# Patient Record
Sex: MALE | Race: WHITE | NOT HISPANIC OR LATINO | Employment: OTHER
[De-identification: names, ages, dates, MRNs, and addresses within clinical notes are randomized per-mention and may not be internally consistent; named-entity substitution may affect disease eponyms.]

---

## 2017-01-01 ENCOUNTER — HOSPITAL ENCOUNTER (OUTPATIENT)
Dept: LAB | Age: 82
Setting detail: SPECIMEN
Discharge: HOME | End: 2017-11-09

## 2017-01-01 ENCOUNTER — AMBULATORY - HEALTHEAST (OUTPATIENT)
Dept: CARDIOLOGY | Facility: CLINIC | Age: 82
End: 2017-01-01

## 2017-01-01 ENCOUNTER — RECORDS - HEALTHEAST (OUTPATIENT)
Dept: ADMINISTRATIVE | Facility: OTHER | Age: 82
End: 2017-01-01

## 2017-01-01 ENCOUNTER — COMMUNICATION - HEALTHEAST (OUTPATIENT)
Dept: VASCULAR SURGERY | Facility: CLINIC | Age: 82
End: 2017-01-01

## 2017-01-01 ENCOUNTER — AMBULATORY - HEALTHEAST (OUTPATIENT)
Dept: VASCULAR SURGERY | Facility: CLINIC | Age: 82
End: 2017-01-01

## 2017-01-01 ENCOUNTER — COMMUNICATION - HEALTHEAST (OUTPATIENT)
Dept: CARDIOLOGY | Facility: CLINIC | Age: 82
End: 2017-01-01

## 2017-01-01 ENCOUNTER — OFFICE VISIT - HEALTHEAST (OUTPATIENT)
Dept: VASCULAR SURGERY | Facility: CLINIC | Age: 82
End: 2017-01-01

## 2017-01-01 ENCOUNTER — OFFICE VISIT - HEALTHEAST (OUTPATIENT)
Dept: CARDIOLOGY | Facility: CLINIC | Age: 82
End: 2017-01-01

## 2017-01-01 ENCOUNTER — SURGERY - HEALTHEAST (OUTPATIENT)
Dept: CARDIOLOGY | Facility: CLINIC | Age: 82
End: 2017-01-01

## 2017-01-01 ENCOUNTER — HOSPITAL ENCOUNTER (OUTPATIENT)
Dept: CARDIOLOGY | Facility: CLINIC | Age: 82
Discharge: HOME | End: 2017-08-17
Attending: INTERNAL MEDICINE

## 2017-01-01 ENCOUNTER — HOSPITAL ENCOUNTER (OUTPATIENT)
Dept: ULTRASOUND IMAGING | Facility: CLINIC | Age: 82
Discharge: HOME | End: 2017-11-29
Attending: PHYSICAL MEDICINE & REHABILITATION

## 2017-01-01 ENCOUNTER — AMBULATORY - HEALTHEAST (OUTPATIENT)
Dept: CARE COORDINATION | Facility: CLINIC | Age: 82
End: 2017-01-01

## 2017-01-01 ENCOUNTER — COMMUNICATION - HEALTHEAST (OUTPATIENT)
Dept: SCHEDULING | Facility: CLINIC | Age: 82
End: 2017-01-01

## 2017-01-01 DIAGNOSIS — L08.9 INFECTED WOUND: ICD-10-CM

## 2017-01-01 DIAGNOSIS — I42.9 CARDIOMYOPATHY (H): ICD-10-CM

## 2017-01-01 DIAGNOSIS — Z95.810 ICD (IMPLANTABLE CARDIOVERTER-DEFIBRILLATOR) IN PLACE: ICD-10-CM

## 2017-01-01 DIAGNOSIS — Z95.810 ICD (IMPLANTABLE CARDIOVERTER-DEFIBRILLATOR), BIVENTRICULAR, IN SITU: ICD-10-CM

## 2017-01-01 DIAGNOSIS — B35.1 ONYCHOMYCOSIS WITH INGROWN TOENAIL: ICD-10-CM

## 2017-01-01 DIAGNOSIS — L97.921 LEG ULCER, LEFT, LIMITED TO BREAKDOWN OF SKIN (H): ICD-10-CM

## 2017-01-01 DIAGNOSIS — L97.521 SKIN ULCER OF LEFT FOOT, LIMITED TO BREAKDOWN OF SKIN (H): ICD-10-CM

## 2017-01-01 DIAGNOSIS — I87.2 VENOUS INSUFFICIENCY OF BOTH LOWER EXTREMITIES: ICD-10-CM

## 2017-01-01 DIAGNOSIS — I89.0 ACQUIRED LYMPHEDEMA OF LEG: ICD-10-CM

## 2017-01-01 DIAGNOSIS — I50.22 CHRONIC SYSTOLIC HEART FAILURE (H): ICD-10-CM

## 2017-01-01 DIAGNOSIS — I87.333 CHRONIC VENOUS HYPERTENSION (IDIOPATHIC) WITH ULCER AND INFLAMMATION OF BILATERAL LOWER EXTREMITY (H): ICD-10-CM

## 2017-01-01 DIAGNOSIS — I48.20 CHRONIC ATRIAL FIBRILLATION (H): ICD-10-CM

## 2017-01-01 DIAGNOSIS — M79.672 LEFT FOOT PAIN: ICD-10-CM

## 2017-01-01 DIAGNOSIS — I87.8 VENOUS STASIS: ICD-10-CM

## 2017-01-01 DIAGNOSIS — T14.8XXA INFECTED WOUND: ICD-10-CM

## 2017-01-01 DIAGNOSIS — I50.43 ACUTE ON CHRONIC COMBINED SYSTOLIC AND DIASTOLIC CONGESTIVE HEART FAILURE (H): ICD-10-CM

## 2017-01-01 DIAGNOSIS — D64.9 ANEMIA, UNSPECIFIED TYPE: ICD-10-CM

## 2017-01-01 DIAGNOSIS — L84 FOOT CALLUS: ICD-10-CM

## 2017-01-01 DIAGNOSIS — N18.30 CKD (CHRONIC KIDNEY DISEASE) STAGE 3, GFR 30-59 ML/MIN (H): ICD-10-CM

## 2017-01-01 DIAGNOSIS — I42.9 CARDIOMYOPATHY, UNSPECIFIED TYPE (H): ICD-10-CM

## 2017-01-01 DIAGNOSIS — I42.8 OTHER PRIMARY CARDIOMYOPATHIES: ICD-10-CM

## 2017-01-01 DIAGNOSIS — M79.605 LEFT LEG PAIN: ICD-10-CM

## 2017-01-01 DIAGNOSIS — I50.43 ACUTE ON CHRONIC SYSTOLIC AND DIASTOLIC HEART FAILURE, NYHA CLASS 3 (H): ICD-10-CM

## 2017-01-01 DIAGNOSIS — L90.5 SCAR CONDITION AND FIBROSIS OF SKIN: ICD-10-CM

## 2017-01-01 DIAGNOSIS — L97.201: ICD-10-CM

## 2017-01-01 DIAGNOSIS — L03.116 LEFT LEG CELLULITIS: ICD-10-CM

## 2017-01-01 DIAGNOSIS — Z45.02 ICD (IMPLANTABLE CARDIOVERTER-DEFIBRILLATOR) BATTERY DEPLETION: ICD-10-CM

## 2017-01-01 DIAGNOSIS — I73.9 CLAUDICATION (H): ICD-10-CM

## 2017-01-01 DIAGNOSIS — L60.0 ONYCHOMYCOSIS WITH INGROWN TOENAIL: ICD-10-CM

## 2017-01-01 LAB
AORTIC ROOT: 2.8 CM
BSA FOR ECHO PROCEDURE: 1.99 M2
CV BLOOD PRESSURE: NORMAL MMHG
CV ECHO HEIGHT: 68 IN
CV ECHO WEIGHT: 183 LBS
DOP CALC LVOT AREA: 3.46 CM2
DOP CALC LVOT DIAMETER: 2.1 CM
DOP CALC LVOT PEAK VEL: 71.4 CM/S
DOP CALC LVOT STROKE VOLUME: 56.1 CM3
DOP CALCLVOT PEAK VEL VTI: 16.2 CM
EJECTION FRACTION: 46 % (ref 55–75)
FRACTIONAL SHORTENING: 23.9 % (ref 28–44)
HCC DEVICE COMMENTS: NORMAL
INTERVENTRICULAR SEPTUM IN END DIASTOLE: 1.3 CM (ref 0.6–1)
IVS/PW RATIO: 1.2
LA AREA 1: 25.4 CM2
LA AREA 2: 19.7 CM2
LEFT ATRIUM LENGTH: 5.87 CM
LEFT ATRIUM SIZE: 4.5 CM
LEFT ATRIUM TO AORTIC ROOT RATIO: 1.61 NO UNITS
LEFT ATRIUM VOLUME INDEX: 36.4 ML/M2
LEFT ATRIUM VOLUME: 72.5 CM3
LEFT VENTRICLE CARDIAC INDEX: 1.7 L/MIN/M2
LEFT VENTRICLE CARDIAC OUTPUT: 3.4 L/MIN
LEFT VENTRICLE DIASTOLIC VOLUME INDEX: 93 CM3/M2 (ref 34–74)
LEFT VENTRICLE DIASTOLIC VOLUME: 185 CM3 (ref 62–150)
LEFT VENTRICLE HEART RATE: 61 BPM
LEFT VENTRICLE MASS INDEX: 103 G/M2
LEFT VENTRICLE SYSTOLIC VOLUME INDEX: 50.3 CM3/M2 (ref 11–31)
LEFT VENTRICLE SYSTOLIC VOLUME: 100 CM3 (ref 21–61)
LEFT VENTRICULAR INTERNAL DIMENSION IN DIASTOLE: 4.6 CM (ref 4.2–5.8)
LEFT VENTRICULAR INTERNAL DIMENSION IN SYSTOLE: 3.5 CM (ref 2.5–4)
LEFT VENTRICULAR MASS: 205 G
LEFT VENTRICULAR OUTFLOW TRACT MEAN GRADIENT: 1 MMHG
LEFT VENTRICULAR OUTFLOW TRACT MEAN VELOCITY: 48.5 CM/S
LEFT VENTRICULAR OUTFLOW TRACT PEAK GRADIENT: 2 MMHG
LEFT VENTRICULAR POSTERIOR WALL IN END DIASTOLE: 1.1 CM (ref 0.6–1)
LV STROKE VOLUME INDEX: 28.2 ML/M2
MITRAL REGURGITANT VELOCITY TIME INTEGRAL: 154 CM
MITRAL VALVE E/A RATIO: 2.2
MR FLOW: 17 CM3
MR MEAN GRADIENT: 57 MMHG
MR MEAN VELOCITY: 360 CM/S
MR PEAK GRADIENT: 88.4 MMHG
MR PISA EROA: 0.1 CM2
MR PISA RADIUS: 0.5 CM
MR PISA VN NYQUIST: 32.3 CM/S
MV AVERAGE E/E' RATIO: 13.5 CM/S
MV DECELERATION TIME: 169 MS
MV E'TISSUE VEL-LAT: 11.2 CM/S
MV E'TISSUE VEL-MED: 8.38 CM/S
MV LATERAL E/E' RATIO: 11.8
MV MEDIAL E/E' RATIO: 15.8
MV PEAK A VELOCITY: 60.7 CM/S
MV PEAK E VELOCITY: 132 CM/S
MV REGURGITANT VOLUME: 16.6 CC
NUC REST DIASTOLIC VOLUME INDEX: 2928 LBS
NUC REST SYSTOLIC VOLUME INDEX: 68 IN
PISA MR PEAK VEL: 470 CM/S
RIGHT VENTRICULAR INTERNAL DIMENSION IN DYSTOLE: 2.9 CM
TRICUSPID REGURGITATION PEAK PRESSURE GRADIENT: 43 MMHG
TRICUSPID VALVE ANULAR PLANE SYSTOLIC EXCURSION: 1.5 CM
TRICUSPID VALVE PEAK REGURGITANT VELOCITY: 328 CM/S

## 2017-01-01 ASSESSMENT — MIFFLIN-ST. JEOR
SCORE: 1394.22
SCORE: 1439.58
SCORE: 1414.64
SCORE: 1451.38
SCORE: 1487.21
SCORE: 1518.97
SCORE: 1439.58
SCORE: 1448.21
SCORE: 1491.75

## 2017-01-04 ENCOUNTER — AMBULATORY - HEALTHEAST (OUTPATIENT)
Dept: CARDIOLOGY | Facility: CLINIC | Age: 82
End: 2017-01-04

## 2017-01-04 DIAGNOSIS — Z95.810 ICD (IMPLANTABLE CARDIOVERTER-DEFIBRILLATOR) IN PLACE: ICD-10-CM

## 2017-01-08 ENCOUNTER — COMMUNICATION - HEALTHEAST (OUTPATIENT)
Dept: VASCULAR SURGERY | Facility: CLINIC | Age: 82
End: 2017-01-08

## 2017-01-08 DIAGNOSIS — L97.201: ICD-10-CM

## 2017-01-08 DIAGNOSIS — B35.1 ONYCHOMYCOSIS WITH INGROWN TOENAIL: ICD-10-CM

## 2017-01-08 DIAGNOSIS — I87.8 VENOUS STASIS: ICD-10-CM

## 2017-01-08 DIAGNOSIS — L90.5 SCAR CONDITION AND FIBROSIS OF SKIN: ICD-10-CM

## 2017-01-08 DIAGNOSIS — I89.0 ACQUIRED LYMPHEDEMA OF LEG: ICD-10-CM

## 2017-01-08 DIAGNOSIS — L84 FOOT CALLUS: ICD-10-CM

## 2017-01-08 DIAGNOSIS — L60.0 ONYCHOMYCOSIS WITH INGROWN TOENAIL: ICD-10-CM

## 2021-05-28 ENCOUNTER — RECORDS - HEALTHEAST (OUTPATIENT)
Dept: ADMINISTRATIVE | Facility: CLINIC | Age: 86
End: 2021-05-28

## 2021-05-29 ENCOUNTER — RECORDS - HEALTHEAST (OUTPATIENT)
Dept: ADMINISTRATIVE | Facility: CLINIC | Age: 86
End: 2021-05-29

## 2021-05-30 ENCOUNTER — RECORDS - HEALTHEAST (OUTPATIENT)
Dept: ADMINISTRATIVE | Facility: CLINIC | Age: 86
End: 2021-05-30

## 2021-05-31 VITALS — BODY MASS INDEX: 27.74 KG/M2 | WEIGHT: 183 LBS | HEIGHT: 68 IN

## 2021-05-31 VITALS — BODY MASS INDEX: 26.87 KG/M2 | HEIGHT: 69 IN | WEIGHT: 181.4 LBS

## 2021-05-31 VITALS — HEIGHT: 68 IN | WEIGHT: 173 LBS | BODY MASS INDEX: 26.22 KG/M2

## 2021-05-31 VITALS — HEIGHT: 69 IN | WEIGHT: 190 LBS | BODY MASS INDEX: 28.14 KG/M2

## 2021-05-31 VITALS — HEIGHT: 69 IN | WEIGHT: 182.1 LBS | BODY MASS INDEX: 26.97 KG/M2

## 2021-05-31 VITALS — BODY MASS INDEX: 25.77 KG/M2 | HEIGHT: 69 IN | WEIGHT: 174 LBS

## 2021-05-31 VITALS — WEIGHT: 191 LBS | HEIGHT: 69 IN | BODY MASS INDEX: 28.29 KG/M2

## 2021-05-31 VITALS — BODY MASS INDEX: 27.74 KG/M2 | HEIGHT: 68 IN | WEIGHT: 183 LBS

## 2021-05-31 VITALS — BODY MASS INDEX: 29.18 KG/M2 | HEIGHT: 69 IN | WEIGHT: 197 LBS

## 2021-06-02 ENCOUNTER — RECORDS - HEALTHEAST (OUTPATIENT)
Dept: ADMINISTRATIVE | Facility: CLINIC | Age: 86
End: 2021-06-02

## 2021-06-09 ENCOUNTER — RECORDS - HEALTHEAST (OUTPATIENT)
Dept: ADMINISTRATIVE | Facility: CLINIC | Age: 86
End: 2021-06-09

## 2021-06-10 NOTE — PROGRESS NOTES
[facility] Heart Care Note    Assessment:  1.  Chronic atrial fibrillation with pacemaker dependence - poor escape rhythm. High %   2. Medtronic  ICD  Melvin lead; 7-        generator replacement 05/20/2012. LV lead placed; but cannot pace the left ventricle without  PNS. -LV lead turned  off  4. Advanced left ventricular dysfunction with ejection fraction 25%.        Echocardiogram 6- with EF=46%       Echocardiogram April 26, 2016 showed ejection fraction equals 45%  5. History of out-of-hospital ventricular fibrillation        rescued from ICD on 07/16/2014.   6. Chronic anticoagulation with warfarin.   7. Gout.  CRF; Cr=2.49 10-      Plan:  Pacemaker defibrillator evaluation today is good  Battery  Voltage of the ICD  is low and I anticipate that we will need to change out the generator within the next 3-6 months   I anticipate that this will be a generator replacement and no new leads would be implanted.  If so, this could be done as an outpatient  I do not anticipate any new leads even though there is concern about his RV lead and the LV lead is not working  Otherwise device evaluation was excellent  You do have an LV lead but this has been turned off because of phrenic nerve stimulation issues  Medtronic suspect   lead; 6949; [place] with nominal function  Continue to have device checked every month  Same medications    Subjective:    I had the opportunity to see.Theodore Gomes , who is a 89 y.o. male with a known history of advanced left ventricular dysfunction and pacemaker dependence  Chato is quite satisfied with his exercise capacity.  Although he slowed down, he still can do most of his activities without limitations.  He does remain fairly active  Is not been having angina  He denies excessive breathlessness orthopnea PND or pedal edema  He said no awareness of arrhythmias, no syncope  He was seen in the emergency room February 22, 2017 with GI distress nausea and vomiting.   Fortunately these symptoms resolved within 24 hours.  At that time his creatinine equals 2.27, hemoglobin equals 10.9      Problem List:  Patient Active Problem List   Diagnosis     Essential hypertension     Cardiomyopathy     Permanent Atrial Fibrillation     Hyperlipidemia     Mitral Regurgitation, moderate     CKD (chronic kidney disease) stage 3, GFR 30-59 ml/min     Venous stasis     Acquired lymphedema of leg     Callus     Chronic systolic heart failure     Anemia     Medical History:  Past Medical History:   Diagnosis Date     Anemia      Atrial fibrillation      BPH (benign prostatic hyperplasia)      Cardiac defibrillator in place      Chronic systolic heart failure      Clostridium difficile colitis 6/29/2014     CVA, old, homonymous hemianopsia      Ejection fraction < 50% at 25%     Esophagitis      Hyperlipidemia      Hypertension      Peripheral edema     chronic with venous insufficiency     Stroke Syndrome      Surgical History:  Past Surgical History:   Procedure Laterality Date     VT INSJ/RPLCMT PERM DFB W/TRNSVNS LDS 1/DUAL CHMBR      Description: Cardioverter-defibrillator Pulse Generator Insertion With LV Leads;  Recorded: 06/15/2012;  Comments: 5/22/12 upgraded from ICD to BiVICD     Social History:  Social History     Social History     Marital status:      Spouse name: N/A     Number of children: N/A     Years of education: N/A     Occupational History     Not on file.     Social History Main Topics     Smoking status: Former Smoker     Quit date: 6/27/1958     Smokeless tobacco: Never Used     Alcohol use No     Drug use: No     Sexual activity: Yes     Partners: Female     Other Topics Concern     Not on file     Social History Narrative       Review of Systems:      General: WNL  Eyes: WNL  Ears/Nose/Throat: WNL  Lungs: WNL  Heart: WNL  Stomach: WNL  Bladder: WNL  Muscle/Joints: WNL  Skin: WNL  Nervous System: WNL  Mental Health: WNL     Blood: WNL        Family History:  Family  History   Problem Relation Age of Onset     Early death Sister          Allergies:  Allergies   Allergen Reactions     Hydromorphone Anaphylaxis     Clindamycin Diarrhea     Cephalexin Rash       Medications:  Current Outpatient Prescriptions   Medication Sig Dispense Refill     acetaminophen (TYLENOL) 500 MG tablet Take 1,000 mg by mouth every 6 (six) hours as needed for pain.       allopurinol (ZYLOPRIM) 100 MG tablet Take 100 mg by mouth 2 (two) times a day.        bumetanide (BUMEX) 2 MG tablet Take 2 mg by mouth 2 (two) times a day.   0     carvedilol (COREG) 25 MG tablet Take 25 mg by mouth 2 (two) times a day with meals.       diclofenac sodium (VOLTAREN) 1 % Gel Apply topically 4 (four) times a day.       fluocinonide (LIDEX) 0.05 % cream APPLY TO ITCHING RED AREAS OF SKIN TWICE DAILY AS NEEDED. DO NOT APPLY TO OPENED AREAS OF SKIN 30 g 2     fluticasone (FLONASE) 50 mcg/actuation nasal spray 1 spray into each nostril daily.       L ACIDOPHIL/B LACTIS/B LONGUM (FLORAJEN3 ORAL) Take 1 tablet by mouth daily.       lisinopril (PRINIVIL,ZESTRIL) 10 MG tablet Take 5 mg by mouth daily.        lisinopril (PRINIVIL,ZESTRIL) 5 MG tablet Take 5 mg by mouth daily.       miconazole (REMEDY ANTIFUNGAL) 2 % cream Apply topically 2 (two) times a day.       omeprazole (PRILOSEC) 20 MG capsule Take 20 mg by mouth daily.       piroxicam (FELDENE) 10 MG capsule Take 10 mg by mouth daily.       potassium chloride SA (K-DUR,KLOR-CON) 20 MEQ tablet Take 20 mEq by mouth 3 (three) times a day.       simvastatin (ZOCOR) 20 MG tablet Take 20 mg by mouth bedtime.       warfarin (COUMADIN) 2.5 MG tablet Take 2.5 mg by mouth daily. Adjust dose based on INR results as directed.       Current Facility-Administered Medications   Medication Dose Route Frequency Provider Last Rate Last Dose     lidocaine 2 % jelly (XYLOCAINE)   Topical PRN Ayesha Washington NP             Surgical site  ICD is well-healed left subclavicular site there is no  "signs of infection    Device interrogation    Intrinsic rhythm is atrial fibrillation with escape rhythm in the 30s   97% ventricular pacing   YENY by voltage but device is not cleared YENY status it is close to that point  Lead function is satisfactory there are no short intervals  Patient was shown the alert tones and could hear them-these will go off if YENY status is reached    Objective:   Vital signs:  /60 (Patient Site: Left Arm, Patient Position: Sitting, Cuff Size: Adult Large)  Pulse 68  Resp 18  Ht 5' 8\" (1.727 m)  Wt 173 lb (78.5 kg)  BMI 26.3 kg/m2      Physical Exam:    GENERAL APPEARANCE: Alert, cooperative and in no acute distress.  HEENT: No scleral icterus. No Xanthelasma. Oral mucous membranes pink and moist.  NECK: JVP cm. No Hepatojugular reflux. Thyroid not  Palpable  CHEST: clear to auscultation and percussion  CARDIOVASCULAR: S1, S2 without murmur    Brachial, radial  pulses are intact and symmetric.   No carotid bruits noted.  ABDOMEN: Non tender. BS+. No bruits.  EXTREMITIES: No cyanosis, clubbing or edema.    Lab Results:  LIPIDS:  No results found for: CHOL  No results found for: HDL  No results found for: LDLCALC  No results found for: TRIG  No components found for: CHOLHDL    BMP:  Lab Results   Component Value Date    CREATININE 2.27 (H) 02/22/2017    BUN 83 (H) 02/22/2017     02/22/2017    K 4.9 02/22/2017     02/22/2017    CO2 24 02/22/2017         This note has been dictated using voice recognition software. Any grammatical or context distortions are unintentional and inherent to the software.  Young Yung MD  [facility]  [phone]            "

## 2021-06-10 NOTE — PROGRESS NOTES
In clinic device check with Dr. Yung.  Please see link for full device report.  Patient was informed of results and next follow up during today's visit.

## 2021-06-11 NOTE — PROGRESS NOTES
8/13/1925  [phone] (home)  There is no such number on file (mobile).    +++Important patient information for CSC/Cath Lab staff : None+++    [facility] EP Cath Lab Procedure Order     Device Implant/Revision:  Procedure: Generator Change Out  Device Type: BIV ICD  Device Company/Device Rep Needed for Procedure: Medtronic    Diagnosis:  Cardiomyopathy  Anticipated Case Duration:  2  Scheduling Needs/Timeframe:  Next Available  Anesthesia:  None  Research Protocol:  No    [facility] EP Cath Lab Prep   Ordering Provider: Dr Yung  Ordering Date: 6/13/2017  Orders Status: Intial order placed and Order set placed  EP NC Contact: Sherron Egan RN    H&P:  Compled by Dr Rivas on 5/25/17  PCP: Samantha Ledezma MD, [phone]    Pre-op Labs: Ordered AM of procedure    Medical Records Pertinent for Procedure:  Echo 4/26/16 EPIC, EKG 2/22/17 EPIC and Device Implant Record 5/22/12    Patient Education:    Teach with Patient: Completed via phone prior to procedure    Risks Reviewed:        Internal Cardiac Defibrillator     <1% for each of the following: infection, bleeding, hematoma,   pneumothorax, subclavian vein thrombosis, cardiac perforation, cardiac tamponade, arrhythmias, pectoral or diaphragmatic stimulation, air embolus, pocket erosion.    <4 % lead dislodgment; <1% lead fracture or generator malfunction.    <0.5% CVA or MI.     <0.1% death.    If external defibrillation is needed, 25% risk for superficial burn.    <5% risk of ICD system revision.    >95% VT/VF success implant rate.    Risks associated with general anesthesia will be addressed by the Anesthesiology Department.    For patients on anticoagulation, the risk of bleeding, hematoma, tamponade, and stroke with partial withdrawal are increased.  Patient education also completed on MARIE, spurious shocks, driving limitation, and psychological and social aspects of having an ICD.    Biventricular Implants  In addition to standard risks for ICD or pacemaker implant,  there is:    90% success of LV lead placement.    10%  dislodgment (LV lead)    <3% risk venous rupture, cardiac tamponade  Patient counseled re: options if LV lead placement is not feasible transvenously.    Consent: Will be obtained in [facility] day of procedure    Pre-Procedure Instructions that were Reviewed with Patient:  NPO after midnight, Notified patient of time and date of procedure by CV , Transportation arrangements needed s/p procedure, Post-procedure follow up process, Sedation plan/orders and Pre-procedure letter was sent to pt by CV     Pre-Procedure Medication Instructions:  Instructions given to pt regarding anticoagulants: Coumadin- to hold 2days prior to procedure  Instructions given to pt regarding antiarrhythmic medication: N/A; N/A  Instructions for medication, other than anticoagulants/antiarrhythmics listed above, given to pt: to take all morning medications with small sips of water, with the exception of OTC supplements and MVI      Allergies   Allergen Reactions     Hydromorphone Anaphylaxis     Clindamycin Diarrhea     Cephalexin Rash       Current Outpatient Prescriptions:      acetaminophen (TYLENOL) 500 MG tablet, Take 1,000 mg by mouth every 6 (six) hours as needed for pain., Disp: , Rfl:      allopurinol (ZYLOPRIM) 100 MG tablet, Take 100 mg by mouth 2 (two) times a day. , Disp: , Rfl:      bumetanide (BUMEX) 2 MG tablet, Take 2 mg by mouth 2 (two) times a day. , Disp: , Rfl: 0     carvedilol (COREG) 25 MG tablet, Take 25 mg by mouth 2 (two) times a day with meals., Disp: , Rfl:      diclofenac sodium (VOLTAREN) 1 % Gel, Apply topically 4 (four) times a day., Disp: , Rfl:      fluocinonide (LIDEX) 0.05 % cream, APPLY TO ITCHING RED AREAS OF SKIN TWICE DAILY AS NEEDED. DO NOT APPLY TO OPENED AREAS OF SKIN, Disp: 30 g, Rfl: 2     fluticasone (FLONASE) 50 mcg/actuation nasal spray, 1 spray into each nostril daily., Disp: , Rfl:      L ACIDOPHIL/B LACTIS/B LONGUM  (FLORAJEN3 ORAL), Take 1 tablet by mouth daily., Disp: , Rfl:      lisinopril (PRINIVIL,ZESTRIL) 10 MG tablet, Take 5 mg by mouth daily. , Disp: , Rfl:      lisinopril (PRINIVIL,ZESTRIL) 5 MG tablet, Take 5 mg by mouth daily., Disp: , Rfl:      miconazole (REMEDY ANTIFUNGAL) 2 % cream, Apply topically 2 (two) times a day., Disp: , Rfl:      omeprazole (PRILOSEC) 20 MG capsule, Take 20 mg by mouth daily., Disp: , Rfl:      piroxicam (FELDENE) 10 MG capsule, Take 10 mg by mouth daily., Disp: , Rfl:      potassium chloride SA (K-DUR,KLOR-CON) 20 MEQ tablet, Take 20 mEq by mouth 3 (three) times a day., Disp: , Rfl:      simvastatin (ZOCOR) 20 MG tablet, Take 20 mg by mouth bedtime., Disp: , Rfl:      warfarin (COUMADIN) 2.5 MG tablet, Take 2.5 mg by mouth daily. Adjust dose based on INR results as directed., Disp: , Rfl:     Current Facility-Administered Medications:      lidocaine 2 % jelly (XYLOCAINE), , Topical, PRN, Ayesha Washington NP

## 2021-06-11 NOTE — PROGRESS NOTES
EP MD/EP NC YENY Review  Dr Yung reviewed YENY checklist  Reviewed by Sherron Egan    Pt aware of above orders and Order for procedure placed in EPIC and  aware  Sherron

## 2021-06-11 NOTE — PROGRESS NOTES
"Device Data  ICD and Biventricular     :  Medtronic  Model:  [id number] Protecta CRT-D  Serial Number:  [id number]     Implant Date: 5/22/2012  YENY Date:  6/9/2017  Device Diagnosis:  Ventricular Fibrillation; secondary prevention     Device Alert(s):  No     Lead Data  (Print Device History and attach to this document. Enter each lead  and model number.)  Last Interrogation Date: 5/25/2017       Atrium: Medtronic 5076 CapSureFix Novus                        Lead Imp:  342Ohms, Pacing Threshold:  N/A and Sensing Threshold:  0.125 mV       Right Ventricle: Medtronic 6949 Sprint [place]                        Lead Imp:  361Ohms, Pacing Threshold:  1.5V @ 0.4 ms and Sensing Threshold:  5.75 mV                        Shock Imp: 43/58 Ohms       Left Ventricle: Medtronic 4296 Attain Ability Plus                        Lead Imp:  703Ohms, Pacing Threshold:  N/A and Sensing Threshold:  N/A-- LV lead programmed off in 2013 due to Phrenic Nerve Stimulation     Old Leads Present/Abandoned: No     Lead Alert(s):  Yes  Description:  RV lead is a Medtronic 6949 lead, possible lead fractures; per Paceart this lead was \"repaired\" on 5/22/2012. No short v-v interval counts. Lead measurements stable.   CRT-D at [facility]  LV lead has been turned off because of phrenic nerve stimulation so he essentially has a dual-chamber device  Is pacemaker dependent  Plan  Hold warfarin for 2 days prior to procedure  ICD generator replacement with Medtronic  Do not anticipate any new leads  No DFT  "

## 2021-06-11 NOTE — PROGRESS NOTES
"Heart Care Device Change-Out Checklist (YENY Checklist)    Device Data  ICD and Biventricular    :  Medtronic  Model:  [id number] Protecta CRT-D  Serial Number:  [id number]    Implant Date: 5/22/2012  YENY Date:  6/9/2017  Device Diagnosis:  Ventricular Fibrillation; secondary prevention    Device Alert(s):  No    Lead Data  (Print Device History and attach to this document. Enter each lead  and model number.)  Last Interrogation Date: 5/25/2017      Atrium: Medtronic 5076 CapSureFix Novus   Lead Imp:  342Ohms, Pacing Threshold:  N/A and Sensing Threshold:  0.125 mV      Right Ventricle: Medtronic 6949 Sprint [place]   Lead Imp:  361Ohms, Pacing Threshold:  1.5V @ 0.4 ms and Sensing Threshold:  5.75 mV   Shock Imp: 43/58 Ohms      Left Ventricle: Medtronic 4296 Attain Ability Plus   Lead Imp:  703Ohms, Pacing Threshold:  N/A and Sensing Threshold:  N/A-- LV lead programmed off in 2013 due to Phrenic Nerve Stimulation    Old Leads Present/Abandoned: No    Lead Alert(s):  Yes  Description:  RV lead is a Medtronic 6949 lead, possible lead fractures; per Paceart this lead was \"repaired\" on 5/22/2012. No short v-v interval counts. Lead measurements stable.    Diagnostic Information  Intrinsic Rhythm:  Atrial Fibrillation with slow ventricular response, rates in the 30s.     Atrial Fibrillation:  Chronic Atrial-Fib  Takes Anticoagulant? Yes  Description:  Warfarin    Pacing Percentages  Atrial Pacing N/A% and Ventricle Pacing 97%  Pacemaker Dependent? No- History of Dependency listed in Paceart    History of VT/VF therapy    ATP: No  Appropriate Shocks:  Yes, 30J shock for VF in July 2014    Ejection Fraction  Last EF Date:  4/26/16    By Echocardiogram  Last EF Measurement:  45%    Heart Failure Diagnostics:  N/A    Special Instructions/Timeframe for change-out:  Per Carelink notes: Device has <3 months to EOS, needs change out soon.    Routed to EP:  Yes: Dr. Young Yung      Device RN: Edie CLIFTON" NALINI Goetz

## 2021-06-12 NOTE — PROGRESS NOTES
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  He will have pulse generator replaced later this month.  Dr. Yung is not planning to replace LV lead which is deactivated at present time.  Continue current dose of Bumex.  Continue local therapy through lymphedema clinic.  Routine follow-up in 6 months    Subjective:   This is 89 y.o. male who comes in today for routine follow-up visit.  He has chronic lower extremity edema.  He sees Dr. Delcid in vascular clinic.  He denies PND and orthopnea.  He has not had chest pains.    Review of Systems:   General: WNL  Eyes: WNL  Ears/Nose/Throat: WNL  Lungs: WNL  Heart: WNL  Stomach: WNL  Bladder: WNL  Muscle/Joints: WNL  Skin: WNL  Nervous System: WNL  Mental Health: WNL     Blood: WNL    Objective:   /60 (Patient Site: Left Arm, Patient Position: Sitting, Cuff Size: Adult Large)  Pulse 76  Resp 18  Ht 5' 8\" (1.727 m)  Wt 183 lb (83 kg)  BMI 27.83 kg/m2  Physical Exam:  GENERAL: no distress  NECK: No JVD  LUNGS: Clear to auscultation.  CARDIAC: irregular rhythm, S1 & S2 normal.  No heaves, thrills, gallops or murmurs.  ABDOMEN: flat, negative hepatosplenomegaly, soft and non-tender.  EXTREMITIES: No evidence of cyanosis, clubbing 3+.    Current Outpatient Prescriptions   Medication Sig Dispense Refill     acetaminophen (TYLENOL) 500 MG tablet Take 1,000 mg by mouth every 6 (six) hours as needed for pain.       allopurinol " (ZYLOPRIM) 100 MG tablet Take 100 mg by mouth 2 (two) times a day.        bumetanide (BUMEX) 2 MG tablet Take 2 mg by mouth 2 (two) times a day.   0     carvedilol (COREG) 25 MG tablet Take 25 mg by mouth 2 (two) times a day with meals.       diclofenac sodium (VOLTAREN) 1 % Gel Apply topically 4 (four) times a day.       fluocinonide (LIDEX) 0.05 % cream APPLY TO ITCHING RED AREAS OF SKIN TWICE DAILY AS NEEDED. DO NOT APPLY TO OPENED AREAS OF SKIN 30 g 2     fluticasone (FLONASE) 50 mcg/actuation nasal spray 1 spray into each nostril daily.       L ACIDOPHIL/B LACTIS/B LONGUM (FLORAJEN3 ORAL) Take 1 tablet by mouth daily.       lisinopril (PRINIVIL,ZESTRIL) 5 MG tablet Take 5 mg by mouth daily.       miconazole (REMEDY ANTIFUNGAL) 2 % cream Apply topically 2 (two) times a day.       omeprazole (PRILOSEC) 20 MG capsule Take 20 mg by mouth daily.       piroxicam (FELDENE) 10 MG capsule Take 10 mg by mouth daily.       potassium chloride SA (K-DUR,KLOR-CON) 20 MEQ tablet Take 20 mEq by mouth 3 (three) times a day.       simvastatin (ZOCOR) 20 MG tablet Take 20 mg by mouth bedtime.       warfarin (COUMADIN) 2.5 MG tablet Take 2.5 mg by mouth daily. Adjust dose based on INR results as directed.       lisinopril (PRINIVIL,ZESTRIL) 10 MG tablet Take 5 mg by mouth daily.        Current Facility-Administered Medications   Medication Dose Route Frequency Provider Last Rate Last Dose     lidocaine 2 % jelly (XYLOCAINE)   Topical PRN Ayesha Washington NP           Cardiographics:    Echocardiogram: April 2016 Global hypokinesis of the left ventricle with mild impairment of systolic  function.  Left ventricular ejection fraction is calculated to be 45%.  Pacer wire visualized in right ventricle.  TAPSE is abnormal, which is suggestive of decreased RV systolic function.  Moderately enlarged left atrium.  Mild aortic regurgitation.  Mild to moderate mitral regurgitation is present.  This study was compared with a previously done  echocardiogram 6/24/15. The  results are similar.      Stress Test: 2011 negative perfusion scan      Coronary angio: 2006 no significant coronary artery disease     Lab Results:       No results found for: CHOL  No results found for: HDL  No results found for: LDLCALC  No results found for: TRIG  No components found for: CHOLHDL  BNP   Date Value Ref Range Status   10/04/2016 312 (H) 0 - 93 pg/mL Final       Onur (Quang)  MD Francie

## 2021-06-12 NOTE — PROGRESS NOTES
"DEVICE CLINIC RN POST-OP NOTE    Reason for visit: Post-operative wound and device check; s/p CRT-D generator change-out.     Device: Medtronic [id number] Viva XT CRT-D. Pre-existing leads -  RA Lead: Medtronic 5076 CapSureFix Novus and RV Lead: Medtronic 6949 [place], DOI 07/31/2006. LV Lead: Medtronic 4296 Attain Ability Plus, DOI 05/22/2012.  Procedure date: 08/22/2017  Implant Diagnosis: Cardiomyopathy, ICD Battery Depletion  Implanting Physician: Dr. Young Yung      Assessment  Subjective: \"I've had no problems with this.\"  Vitals:   Vitals:    08/29/17 1252   BP: 90/40   Pulse: 61   Resp: 16   Temp: 97.6  F (36.4  C)     Heart Sounds: normal  Lung Sounds: clear to auscultation bilaterally  Incision/device pocket: Clean, dry and intact with resolving ecchymosis and edema.  There are no signs of infection present.  The surgical staple were removed at today's visit and the area cleansed of residual adhesive from the bandage.  Other: Instructed patient that he can apply an ice pack to the operative site should he experience discomfort.      Device Findings  Interrogation of device reveals normal sensing and capture thresholds  See the Paceart Report for a full summary of the device information.    Other: Intrinsic rhythm at today's visit is atrial fibrillation with a ventricular response in the 30s-50 bpm range.      Patient Education  hTeodore Gomes was accompanied today by his wife, Benita.     [facility]'s Partnership Agreement for Device Patients and Post-operative Checklist were presented and reviewed with  and Mrs. Gomes at today's appointment.    Signs and symptoms of infection, poor wound healing, and normal device function were reviewed. Range of motion and weight restrictions are waived as Mr. Gomes's leads are not newly implanted. He was issued a [employer] remote monitor and instructed on its set-up and use for remote monitoring by the Medtronic representative prior to " hospital discharge. These instructions were reviewed with  and Mrs. Gomes at today s visit.       Plan  - Three month remote device check 11/28/2017    Radha Velasquez, RN, MA  Device Nurse  Former[facility]

## 2021-06-12 NOTE — PROGRESS NOTES
Date of Service: 07/20/17    Date last seen:  09/15/2016    PCP: Samantha Ledezma MD    IMPRESSION:   1. Lower extremity swelling due to venous stasis and secondary lymphedema with       dependent edema under excellent control.   2. Left leg venous ulcerations, ?wound infection-new  3. Stasis dermatitis  4. Complicated by CHF  5. On chronic anticoagulation     PLAN:   1. After permission was obtained 2% Lidocaine HCL jelly was applied, under clean conditions, the left, calf and venous stasis/insufficiency with venous hypertension ulceration(s) were debrided using currette.  Devitalized and non viable tissue, along with any fibrin and slough, was removed to improve granulation tissue formation, stimulate wound healing, decrease overall bacteria load, disrupt biofilm formation and decrease edge senescence.  Total excisional debridement was 4 sq cm from the epidermis/dermis area.   Ulcers were  improved afterwards and .  Measures were as noted on the flow sheet .  2.  Because of underlying concern of cellulitis and wound infection, Braxton swab culture technique was used to send off anaerobic and aerobic Gram stain and culture from the left calf ulcer.  3. Continue velcro compression.    4. Left leg wound care:  Alginate with silver then abd then 2 layer light.   5. Follow up weekly with nursing for dressing changes.  To see me or CNP in 3 weeks.  I will see three weeks later.     ---------------------------------------------------------------------------------------------------------------------     Chief Complaint: Bilateral leg swelling and increased pain left second toe    History of Present Illness:   Theodore Gomes returns to the [facility] for follow up of bilateral leg swelling due to venous stasis and hypertension and secondary lymphedema.  He was doing very well and using his compression regularly.  Three weeks he started develop some flaking of the skin and they started to wash the skin  and using Remedy cream.  He then in the last week developed weeping and ulcers in the front of the left leg.  [facility]   The patient's previous treatment has included MLD, education, compression bandaging, lymphatic exercise, range of motion work, exercise and elevation when able.  There has been no new numbness, tingling, weakness, masses, rashes, shortness of breath or chest pain. He has redeveloped callus in the left second toe. There has been no new fevers or pain. There are no new or changing skin lesions.       Past Medical History:   Diagnosis Date     Anemia      Atrial fibrillation      BPH (benign prostatic hyperplasia)      Cardiac defibrillator in place      Chronic systolic heart failure      Clostridium difficile colitis 6/29/2014     CVA, old, homonymous hemianopsia      Ejection fraction < 50% at 25%     Esophagitis      Hyperlipidemia      Hypertension      Peripheral edema     chronic with venous insufficiency     Stroke Syndrome        Past Surgical History:   Procedure Laterality Date     AL INSJ/RPLCMT PERM DFB W/TRNSVNS LDS 1/DUAL CHMBR      Description: Cardioverter-defibrillator Pulse Generator Insertion With LV Leads;  Recorded: 06/15/2012;  Comments: 5/22/12 upgraded from ICD to BiVICD       Current Outpatient Prescriptions   Medication Sig Dispense Refill     acetaminophen (TYLENOL) 500 MG tablet Take 1,000 mg by mouth every 6 (six) hours as needed for pain.       allopurinol (ZYLOPRIM) 100 MG tablet Take 100 mg by mouth 2 (two) times a day.        bumetanide (BUMEX) 2 MG tablet Take 2 mg by mouth 2 (two) times a day.   0     carvedilol (COREG) 25 MG tablet Take 25 mg by mouth 2 (two) times a day with meals.       diclofenac sodium (VOLTAREN) 1 % Gel Apply topically 4 (four) times a day.       fluocinonide (LIDEX) 0.05 % cream APPLY TO ITCHING RED AREAS OF SKIN TWICE DAILY AS NEEDED. DO NOT APPLY TO OPENED AREAS OF SKIN 30 g  2     fluticasone (FLONASE) 50 mcg/actuation nasal spray 1 spray into each nostril daily.       L ACIDOPHIL/B LACTIS/B LONGUM (FLORAJEN3 ORAL) Take 1 tablet by mouth daily.       lisinopril (PRINIVIL,ZESTRIL) 10 MG tablet Take 5 mg by mouth daily.        lisinopril (PRINIVIL,ZESTRIL) 5 MG tablet Take 5 mg by mouth daily.       miconazole (REMEDY ANTIFUNGAL) 2 % cream Apply topically 2 (two) times a day.       omeprazole (PRILOSEC) 20 MG capsule Take 20 mg by mouth daily.       piroxicam (FELDENE) 10 MG capsule Take 10 mg by mouth daily.       potassium chloride SA (K-DUR,KLOR-CON) 20 MEQ tablet Take 20 mEq by mouth 3 (three) times a day.       simvastatin (ZOCOR) 20 MG tablet Take 20 mg by mouth bedtime.       warfarin (COUMADIN) 2.5 MG tablet Take 2.5 mg by mouth daily. Adjust dose based on INR results as directed.       Current Facility-Administered Medications   Medication Dose Route Frequency Provider Last Rate Last Dose     lidocaine 2 % jelly (XYLOCAINE)   Topical PRN Ayesha Washington NP           Allergies   Allergen Reactions     Hydromorphone Anaphylaxis and Unknown     Clindamycin Diarrhea     Cephalexin Rash       Social History     Social History     Marital status:      Spouse name: N/A     Number of children: N/A     Years of education: N/A     Occupational History     Not on file.     Social History Main Topics     Smoking status: Former Smoker     Packs/day: 0.25     Years: 10.00     Quit date: 6/27/1958     Smokeless tobacco: Never Used     Alcohol use No     Drug use: No     Sexual activity: Yes     Partners: Female     Other Topics Concern     Not on file     Social History Narrative       Family History   Problem Relation Age of Onset     Early death Sister        Review of Systems:  Theodore Gomes no new numbess, tingling or weakness, redness or rashes, fevers, new masses, abdominal bloating or discomfort, unexplained weight loss, new ulcers, shortness of breath and chest pain  Full 12  point review of systems was completed.      Lab Results   Component Value Date/Time    PREALBUMIN 12.1 (L) 03/18/2014 05:23 AM       Physical Exam:  Vitals:    07/20/17 1300   BP: 118/52   Pulse: 64   Resp: 16   Temp: 99  F (37.2  C)     BMI 26.30    Circumferential measures:    Vasc Edema 8/6/2015 10/22/2015 1/28/2016 2/25/2016 9/15/2016   Right just above MTP 24.9 24.1 23.6 25.0 23.5   Right Ankle 26.3 23.9 24.6 26.0 24.2   Right Widest Calf 33.0 33.8 33.0 33.2 34.2   Right Thigh Up 10cm 43.1 42.6 40.5 - -   Left - just above MTP 24.6 24.0 23.3 23.0 23.4   Left Ankle 24.0 24.8 23.9 26.0 25.4   Left Widest Calf 35.5 35.0 34.5 36.0 35.5   Left Thigh Up 10cm 42.0 40.0 39.2 - -     Measures stable.    General:  89 y.o. male in no apparent distress.  Alert and oriented x 3.  Cooperative. Affect normal.    Musculoskeletal:  Normal range of motion in knees and ankles bilaterally throughout .  There is no active joint synovitis, erythema, swelling or joint laxity.      Neurological:  Sensation is slightly decreased to PP and LT in feet bilaterally.    Strength testing is normal in knee flexion, knee extension, ankle dorsiflexion and great toe extension bilaterally.      Vascular: Dorsalis pedis and posterior tibialis pulses are strong and equal bilaterally. There are no significant telangietasias, medial ankle venous flares, venous varicosities  and spider veins . There is normal capillary refill.     Integumentary: Skin of the legs is uniformly warm and dry and and hyperpigmentated.  There are weeping areas along the left anterior and lateral calf with slough.  There is slight rubor and no calor.  Nails are thickened with decreased hair growth on toes, rubor.   Left second distal toe with callus.        Ellie Delcid MD, ABWMS, FACCWS, [facility]  Medical Director Wound Care and Lymphedema  [facility]  [phone]

## 2021-06-12 NOTE — PROGRESS NOTES
Date of Service:8/9/2017    Chief Complaint:   Chief Complaint   Patient presents with     Wound Check       History:    Theodore presents to clinic for reevaluation of his foot ulcers.  I am seeing him in Dr. Delcid's absence.  He was last evaluated by Dr. Delcid on 7/20/2017.  At that visit, there was concern about cellulitis.  A culture was obtained and he was prescribed doxycycline.  He has finished taking his antibiotic.  His leg is doing better.  He no longer is in a 2-layer wrap because he did not tolerate it so he was started in Tubigrip for compression.  Silvercel is being applied to the ulcer an ABD on top.    Current Outpatient Prescriptions   Medication Sig Dispense Refill     acetaminophen (TYLENOL) 500 MG tablet Take 1,000 mg by mouth every 6 (six) hours as needed for pain.       allopurinol (ZYLOPRIM) 100 MG tablet Take 100 mg by mouth 2 (two) times a day.        bumetanide (BUMEX) 2 MG tablet Take 2 mg by mouth 2 (two) times a day.   0     carvedilol (COREG) 25 MG tablet Take 25 mg by mouth 2 (two) times a day with meals.       diclofenac sodium (VOLTAREN) 1 % Gel Apply topically 4 (four) times a day.       fluocinonide (LIDEX) 0.05 % cream APPLY TO ITCHING RED AREAS OF SKIN TWICE DAILY AS NEEDED. DO NOT APPLY TO OPENED AREAS OF SKIN 30 g 2     fluticasone (FLONASE) 50 mcg/actuation nasal spray 1 spray into each nostril daily.       L ACIDOPHIL/B LACTIS/B LONGUM (FLORAJEN3 ORAL) Take 1 tablet by mouth daily.       lisinopril (PRINIVIL,ZESTRIL) 10 MG tablet Take 5 mg by mouth daily.        lisinopril (PRINIVIL,ZESTRIL) 5 MG tablet Take 5 mg by mouth daily.       miconazole (REMEDY ANTIFUNGAL) 2 % cream Apply topically 2 (two) times a day.       omeprazole (PRILOSEC) 20 MG capsule Take 20 mg by mouth daily.       piroxicam (FELDENE) 10 MG capsule Take 10 mg by mouth daily.       potassium chloride SA (K-DUR,KLOR-CON) 20 MEQ tablet Take 20 mEq by mouth 3 (three) times a day.       simvastatin (ZOCOR)  20 MG tablet Take 20 mg by mouth bedtime.       warfarin (COUMADIN) 2.5 MG tablet Take 2.5 mg by mouth daily. Adjust dose based on INR results as directed.       Current Facility-Administered Medications   Medication Dose Route Frequency Provider Last Rate Last Dose     lidocaine 2 % jelly (XYLOCAINE)   Topical PRN Ayesha Washington NP   1 application at 08/09/17 1507       Allergies   Allergen Reactions     Hydromorphone Anaphylaxis and Unknown     Clindamycin Diarrhea     Cephalexin Rash       Physical Exam:    Vitals:    08/09/17 1505   BP: 127/60   Pulse: 67   Resp: 16   Temp: 97.8  F (36.6  C)    There is no height or weight on file to calculate BMI.    General:  89 y.o. male in no apparent distress.    Psychiatric:  Alert and oriented x 3.  Cooperative.   Integumentary:  Skin is uniformly warm, dry and pink.  Lower extremity edema: minimal  Ulcerations:  There is no keli wound erythema, induration, maceration, denudement, fluctuance or warmth surrounding the ulcer(s).      Wound 01/28/16 Toe Right;2nd digit (Active)   Pre Size Length 0.5 8/9/2017  3:00 PM   Pre Size Width 0.5 8/9/2017  3:00 PM   Pre Total Sq cm 0.25 8/9/2017  3:00 PM   Post Size Length 1.5 1/28/2016  3:00 PM   Post Size Width 1 1/28/2016  3:00 PM   Post Total Sq cm 1.5 1/28/2016  3:00 PM   Description scab 8/9/2017  3:00 PM   Product Used ABD Pad;Alginate 7/27/2017  2:00 PM       Wound 07/20/17 left anterior foot (Active)   Pre Size Length 2 8/9/2017  3:00 PM   Pre Size Width 1.8 8/9/2017  3:00 PM   Pre Total Sq cm 3.6 8/9/2017  3:00 PM   Post Size Length 1 8/9/2017  3:00 PM   Post Size Width 0.5 8/9/2017  3:00 PM   Description scattered small ulcers 7/20/2017  2:00 PM   Product Used ABD Pad;Alginate 7/28/2017  9:00 AM       Wound 07/20/17 left medial ankle (Active)   Pre Size Length 0 8/9/2017  3:00 PM   Pre Size Width 0 8/9/2017  3:00 PM   Pre Size Depth 0 8/9/2017  3:00 PM   Pre Total Sq cm 0 8/9/2017  3:00 PM   Product Used ABD Pad;Alginate  7/28/2017  9:00 AM       Wound 07/31/17 left posterior leg (Active)   Pre Size Length 1 8/9/2017  3:00 PM   Pre Size Width 0.3 8/9/2017  3:00 PM   Pre Total Sq cm 0.3 8/9/2017  3:00 PM         Assessment:    Treatments:    LABS:     Lab Results   Component Value Date    WBC 9.2 02/22/2017    HGB 10.9 (L) 02/22/2017    HCT 33.4 (L) 02/22/2017     02/22/2017     (L) 02/22/2017               Invalid input(s): EOSPC    Lab Results   Component Value Date    CREATININE 2.27 (H) 02/22/2017         Lab Results   Component Value Date    BUN 83 (H) 02/22/2017     No results found for: CRP  No results found for: SEDRATE      Results for orders placed or performed during the hospital encounter of 02/22/17   Comprehensive Metabolic Panel   Result Value Ref Range    Sodium 137 136 - 145 mmol/L    Potassium 4.9 3.5 - 5.0 mmol/L    Chloride 100 98 - 107 mmol/L    CO2 24 22 - 31 mmol/L    Anion Gap, Calculation 13 5 - 18 mmol/L    Glucose 140 (H) 70 - 125 mg/dL    BUN 83 (H) 8 - 28 mg/dL    Creatinine 2.27 (H) 0.70 - 1.30 mg/dL    GFR MDRD Af Amer 33 (L) >60 mL/min/1.73m2    GFR MDRD Non Af Amer 27 (L) >60 mL/min/1.73m2    Bilirubin, Total 0.9 0.0 - 1.0 mg/dL    Calcium 9.1 8.5 - 10.5 mg/dL    Protein, Total 6.6 6.0 - 8.0 g/dL    Albumin 3.8 3.5 - 5.0 g/dL    Alkaline Phosphatase 152 (H) 45 - 120 U/L    AST 20 0 - 40 U/L    ALT 12 0 - 45 U/L       No results found for: [id number]       1. Leg ulcer, left, limited to breakdown of skin     2. Anemia, unspecified type     3. CKD (chronic kidney disease) stage 3, GFR 30-59 ml/min     4. Chronic venous hypertension (idiopathic) with ulcer and inflammation of bilateral lower extremity     5. Chronic systolic heart failure         A new wound was identified today: no.    Plan:  1.  Debridement of the ulcers  was recommended.  After consent was obtained and topical 2% Xylocaine was applied under clean conditions, and using a #15 blade,the devitalized dermal tissue was  debrided for a total square centimeters of 4.15. Following debridement, there was a decrease in the nonviable tissue. The patient tolerated the procedure without any difficulty.     2.  Left leg and Foot ulcers, improving    3.  Right toe ulcer, improving    4.  Venous dermatitis, will treat with viscopaste    5.  Treatment:   Will discontinue the silvercel and apply layers of viscopaste and an ABD over the ulcers and dermatitis.  A Tubigrip will then be applied.  The dressing will be continued to be changed in one week in the clinic.      6.   Patient will follow up in one week  for reevaluation.          Jacquie Sandoval, APRN, CNP,  [facility]  [phone]

## 2021-06-13 NOTE — PROGRESS NOTES
Patient arrives today for re check of left leg edema with ulcers.  Patient has several scattered open areas on his left leg, most measuring 0.5 x0.5cm.  Patients wife was recently hospitalized and the patient has had minimal wound care.

## 2021-06-14 NOTE — PROGRESS NOTES
Received updated fax from [email or web address]. Pt has reached his deductible for primary insurance and both primary and secondary approved for Epifix.

## 2021-06-14 NOTE — PROGRESS NOTES
Spoke with patient's wife regarding patient's weight gain and increased abd. Bloating. Recommended changing bumex 6 mg to torsemide 120 mg daily. Instructed wife to call if no change in bloating, edema, or weight in the next 2 days. She understood recommendations. Pt has f/u 12/11.

## 2021-06-14 NOTE — PROGRESS NOTES
Date of Service: 11/16/2017      Chief Complaint:   Chief Complaint   Patient presents with     Wound Check       History:   Patient returns to vascular clinic with regards to his leg swelling and ulcer.  I am seeing this patient in Dr. Delcid's absence.  He was last evaluated by Dr. Delcid on December 30 when he was diagnosed with a wound infection and started empirically on Doxycycline, which the organisms cultured were sensitive to.  Silver alginate and an ABD with a 2-layer wrap lite was applied with instructions to have it changed twice per week.  For reasons that are not clear, the dressing has been changed weekly by nursing.  He presents to clinic today with a velcro wrap on top of his 2-layer wrap and a velcro applied upside down on his other leg.  She applied the velcro compression wraps to his legs yesterday because he is having more swelling, especially in the right leg. He has developed an ulcer on his right leg.  He refuses to have an ulcer on his buttock evaluated.      Current Outpatient Prescriptions   Medication Sig Dispense Refill     acetaminophen (TYLENOL) 500 MG tablet Take 1,000 mg by mouth every 6 (six) hours as needed for pain.       allopurinol (ZYLOPRIM) 100 MG tablet Take 100 mg by mouth 2 (two) times a day.        bumetanide (BUMEX) 2 MG tablet Take 2 mg by mouth 2 (two) times a day.   0     carvedilol (COREG) 25 MG tablet Take 25 mg by mouth 2 (two) times a day with meals.       diclofenac sodium (VOLTAREN) 1 % Gel Apply topically 4 (four) times a day.       ferrous sulfate (IRON) 325 (65 FE) MG tablet Take 1 tablet by mouth daily with breakfast.       fluocinonide (LIDEX) 0.05 % cream APPLY TO ITCHING RED AREAS OF SKIN TWICE DAILY AS NEEDED. DO NOT APPLY TO OPENED AREAS OF SKIN 30 g 2     fluticasone (FLONASE) 50 mcg/actuation nasal spray 1 spray into each nostril daily.       L ACIDOPHIL/B LACTIS/B LONGUM (FLORAJEN3 ORAL) Take 1 tablet by mouth daily.       lisinopril  (PRINIVIL,ZESTRIL) 5 MG tablet Take 5 mg by mouth daily.       miconazole (REMEDY ANTIFUNGAL) 2 % cream Apply topically 2 (two) times a day.       omeprazole (PRILOSEC) 20 MG capsule Take 20 mg by mouth daily.       piroxicam (FELDENE) 10 MG capsule Take 10 mg by mouth daily.       potassium chloride SA (K-DUR,KLOR-CON) 20 MEQ tablet Take 1 tablet (20 mEq total) by mouth daily. 30 tablet 0     simvastatin (ZOCOR) 20 MG tablet Take 20 mg by mouth bedtime.       warfarin (COUMADIN) 2.5 MG tablet Take 2.5 mg by mouth daily. Adjust dose based on INR results as directed.       Current Facility-Administered Medications   Medication Dose Route Frequency Provider Last Rate Last Dose     lidocaine 2 % jelly (XYLOCAINE)   Topical PRN Ayesha Washington NP   1 application at 11/16/17 1356       Allergies   Allergen Reactions     Hydromorphone Anaphylaxis and Unknown     Clindamycin Diarrhea     Cephalexin Rash       Physical Exam:    Vitals:    11/16/17 1300   BP: 132/62   Pulse: 64   Resp: 16   Temp: 98.3  F (36.8  C)    There is no height or weight on file to calculate BMI.    General:  89 y.o. male in no apparent distress.    Head:  Normocephalic atraumatic  Psychiatric:  Cooperative.   Respiratory: unlabored breathing.  Cardiovascular-Lower extremity: Edema: firm    Vasc Edema 9/14/2017 9/21/2017 10/30/2017 11/7/2017 11/16/2017   Right just above MTP - - 25 - 24   Right Ankle - - 26 - 26   Right Widest Calf - - 35.8 - 42   Right Thigh Up 10cm - - - - -   Left - just above MTP 24.8 23 25 24 24   Left Ankle 23.7 24 24.5 23.5 23   Left Widest Calf 34.8 32 39.2 35.7 35   Left Thigh Up 10cm - - - - -     Integumentary:  Skin is uniformly dry and warm        Ulcerations:  There is no keli wound erythema, induration, maceration, denudement, fluctuance or warmth surrounding the ulcer(s). Wound base: 100% granular; Wound margins: intact, .  Undermining no, Tunneling no   Wound 07/20/17 left anterior foot (Active)   Pre Size Length  0.5 11/16/2017  2:00 PM   Pre Size Width 0.3 11/16/2017  2:00 PM   Pre Size Depth 0.1 9/21/2017  3:00 PM   Pre Total Sq cm 0.15 11/16/2017  2:00 PM   Post Size Length 0 9/28/2017 12:00 PM   Post Size Width 0 9/28/2017 12:00 PM   Post Size Depth 0 9/28/2017 12:00 PM   Post Total Sq cm 0.35 9/14/2017 12:00 PM   Undermined n 9/14/2017 12:00 PM   Tunneling n 9/14/2017 12:00 PM   Description red wound bed 8/30/2017  2:00 PM   Prodcut Used Foam 9/21/2017  3:00 PM       Wound 11/03/17 left medial leg superior (Active)   Pre Size Length 0.5 11/16/2017  2:00 PM   Pre Size Width 0.5 11/16/2017  2:00 PM   Pre Total Sq cm 0.25 11/16/2017  2:00 PM       Wound 11/03/17 left medial leg inferior (Active)   Pre Size Length 0.5 11/16/2017  2:00 PM   Pre Size Width 0.5 11/16/2017  2:00 PM   Pre Total Sq cm 0.25 11/16/2017  2:00 PM       Wound 11/07/17 right buttock (Active)   Pre Size Length 0.5 11/7/2017  2:00 PM   Pre Size Width 0.6 11/7/2017  2:00 PM   Pre Total Sq cm 0.3 11/7/2017  2:00 PM       Wound 11/16/17 right medial ankle (Active)   Pre Size Length 0.7 11/16/2017  2:00 PM   Pre Size Width 0.4 11/16/2017  2:00 PM   Pre Total Sq cm 0.21 11/16/2017  2:00 PM       Wound 06/29/14 Other (Comment) Arm Left;Right rash (Active)       Wound 06/29/14 Erythema Sacrum Other (Comment) red-blanchable (Active)       Incision 08/22/17 Chest Left (Active)       Assessment:    Images:  none pertinent    Labs:    Lab Results   Component Value Date    WBC 5.5 08/22/2017    HGB 9.6 (L) 08/22/2017    HCT 28.6 (L) 08/22/2017     08/22/2017     (L) 08/22/2017               Invalid input(s): EOSPC    Lab Results   Component Value Date    CREATININE 2.65 (H) 11/09/2017         Lab Results   Component Value Date    BUN 95 (H) 11/09/2017     Lab Results   Component Value Date/Time    PREALBUMIN 12.1 (L) 03/18/2014 05:23 AM   ESUFAST(LABPROT,LABALBU,albumin)@  Invalid input(s): LABALBU  Lab Results   Component Value Date/Time    PREALBUMIN  12.1 (L) 03/18/2014 05:23 AM       No results found for: CRP  No results found for: SEDRATE  Results for orders placed or performed during the hospital encounter of 02/22/17   Comprehensive Metabolic Panel   Result Value Ref Range    Sodium 137 136 - 145 mmol/L    Potassium 4.9 3.5 - 5.0 mmol/L    Chloride 100 98 - 107 mmol/L    CO2 24 22 - 31 mmol/L    Anion Gap, Calculation 13 5 - 18 mmol/L    Glucose 140 (H) 70 - 125 mg/dL    BUN 83 (H) 8 - 28 mg/dL    Creatinine 2.27 (H) 0.70 - 1.30 mg/dL    GFR MDRD Af Amer 33 (L) >60 mL/min/1.73m2    GFR MDRD Non Af Amer 27 (L) >60 mL/min/1.73m2    Bilirubin, Total 0.9 0.0 - 1.0 mg/dL    Calcium 9.1 8.5 - 10.5 mg/dL    Protein, Total 6.6 6.0 - 8.0 g/dL    Albumin 3.8 3.5 - 5.0 g/dL    Alkaline Phosphatase 152 (H) 45 - 120 U/L    AST 20 0 - 40 U/L    ALT 12 0 - 45 U/L       No results found for: HGBA1C  Lab Results   Component Value Date    TSH 1.4 11/14/2011           No results found for: [id number]  Lab Results   Component Value Date     (H) 11/09/2017        1. Leg ulcer, left, limited to breakdown of skin     2. Anemia, unspecified type     3. Chronic systolic heart failure     4. CKD (chronic kidney disease) stage 3, GFR 30-59 ml/min     5. Cardiomyopathy, unspecified type     6. Acquired lymphedema of leg     7. Venous stasis         A new wound was identified today: yes,  it is located left leg.    Plan:  1.  Debridement of the leg ulcer was recommended.  After consent was obtained and topical 2% Xylocaine was applied under clean conditions, and using a #15 blade,the devitalized dermal tissue was debrided for a total square centimeters of 1.11. Following debridement, there was a decrease in the nonviable tissue. The patient tolerated the procedure without any difficulty.     2.  Left leg ulcers, stable    4.  Cellulitis, resolved    5.  Lymphedema, swelling is worse    6.  Venous dermatitis, will treat with viscopaste.    7.  Treatment: Will apply viscopaste  over his dry skin and cover the open areas with silvercel and an ABD.  A 2-layer wrap lite applied to his bilaterally legs.  In anticipation of the wraps falling, he will have his 2-layer wrap changed twice next week and then weekly. Viscopaste should be discontinued if the dermatitis is resolved.     8.  Patient will follow up with Dr. Delcid in 2 weeks for reevaluation.          Jacquie Sandoval, APRN, CNP,  [facility]  [phone]

## 2021-06-14 NOTE — PROGRESS NOTES
Assessment/Plan:     1. Cardiomyopathy with systolic dysfunction, NYHA class II: Theodore Gomes appears not compensated.  He has moderate lower extremity edema today.  He may have some abdominal bloating also.  He has not been monitoring his weights at home on a daily basis.  He was at his primary doctor's office a few days ago where he had gained 13 pounds in 1 month.  We discussed the importance of weighing himself at home.  He and his wife eat out on a regular basis.  He states he does not eat much as it is.  BMP and BNP pending.  His potassium level at his primary doctor on November 6 was 5.5.  I decreased his potassium to 20 mEq twice a day.  I also recommended him take an extra 2 mg of Bumex tomorrow morning.  He is unsure of his Bumex dosing.  I encouraged him to call my nurse with the correct dose that he has been taking.      Heart failure treatment includes:  - Beta blocker therapy with carvedilol 25 mg twice a day  - ACEI therapy with lisinopril 5 mg daily  - Diuretic therapy with bumetanide 2 mg twice a day    Follow-up in the heart failure clinic in 2 weeks and with Dr. Marmolejo in February and Dr. Yung in May    Subjective:     Theodore Gomes is seen at [facility] heart failure clinic today for continued follow-up.  His wife Benita accompanies him today.  He follows up for cardiomyopathy with systolic dysfunction.  His most recent echocardiogram was done August 2017 which showed ejection fraction of 45%.  He has a past medical history significant for hypertension, hyperlipidemia, permanent atrial fibrillation, chronic kidney disease stage III, lymphedema, and a CVA.  He has a CRT-D that was implanted in May 2012 with a generator change in August 2017.    Today, he comes in after seeing his primary doctor a few days ago in which he had gained 13 pounds in 1 month.  He is currently being treated for cellulitis in his left lower extremity.  He states that he has noticed lower extremity edema  worsened.  He denies any shortness of breath, dyspnea on exertion, orthopnea, or PND.  He states he may have some abdominal bloating.  He denies any other acute heart failure symptoms.  He denies lightheadedness, shortness of breath, dyspnea on exertion, orthopnea, PND, palpitations and chest pain.      He has not been monitoring home weights. He was at his primary doctor's office yesterday and his weight was up 13 pounds in 1 month.  He states his home weight today was at 183 pounds.   He is trying to follow a low sodium diet.       Review of Systems:   General: WNL  Eyes: WNL  Ears/Nose/Throat: WNL  Lungs: WNL  Heart: WNL  Stomach: WNL  Bladder: WNL  Muscle/Joints: WNL  Skin: WNL  Nervous System: WNL  Mental Health: WNL     Blood: WNL     Patient Active Problem List   Diagnosis     Essential hypertension     Cardiomyopathy     Permanent Atrial Fibrillation     Hyperlipidemia     Mitral Regurgitation, moderate     CKD (chronic kidney disease) stage 3, GFR 30-59 ml/min     Venous stasis     Acquired lymphedema of leg     Callus     Chronic systolic heart failure     Anemia     Chronic venous hypertension (idiopathic) with ulcer and inflammation of bilateral lower extremity     Leg ulcer, left, limited to breakdown of skin     Left foot pain     ICD (implantable cardioverter-defibrillator) battery depletion     ICD (implantable cardioverter-defibrillator), biventricular, in situ     Venous insufficiency of both lower extremities     Skin ulcer of left foot, limited to breakdown of skin     Infected wound       Past Medical History:   Diagnosis Date     Acquired lymphedema     left leg     Anemia      Arrhythmia     vtach     Atrial fibrillation      BPH (benign prostatic hyperplasia)      Cardiac defibrillator in place      Cellulitis 8/16-present    L foot , being treated by Dr Delcid at Vascular clinic     CHF (congestive heart failure)      Chronic kidney disease     ckd 3     Chronic systolic heart failure       Clostridium difficile colitis 6/29/2014     CVA, old, homonymous hemianopsia      Ejection fraction < 50% at 25%     Esophagitis      Gout      Hyperlipidemia      Hypertension      Lymphedema of both lower extremities      Nonischemic dilated cardiomyopathy      Peripheral edema     chronic with venous insufficiency     Stroke Syndrome        Past Surgical History:   Procedure Laterality Date     INSERT / REPLACE / REMOVE PACEMAKER  05/22/2012    medtronic ICD     INSERT / REPLACE / REMOVE PACEMAKER  08/22/2017    icd gen change     [place] INSJ/RPLCMT PERM DFB W/TRNSVNS LDS 1/DUAL CHMBR      Description: Cardioverter-defibrillator Pulse Generator Insertion With LV Leads;  Recorded: 06/15/2012;  Comments: 5/22/12 upgraded from ICD to BiVICD       Family History   Problem Relation Age of Onset     Early death Sister        Social History     Social History     Marital status:      Spouse name: N/A     Number of children: N/A     Years of education: N/A     Occupational History     Not on file.     Social History Main Topics     Smoking status: Former Smoker     Packs/day: 0.25     Years: 10.00     Quit date: 6/27/1958     Smokeless tobacco: Never Used     Alcohol use No     Drug use: No     Sexual activity: Yes     Partners: Female     Other Topics Concern     Not on file     Social History Narrative       Current Outpatient Prescriptions   Medication Sig Dispense Refill     acetaminophen (TYLENOL) 500 MG tablet Take 1,000 mg by mouth every 6 (six) hours as needed for pain.       allopurinol (ZYLOPRIM) 100 MG tablet Take 100 mg by mouth 2 (two) times a day.        bumetanide (BUMEX) 2 MG tablet Take 2 mg by mouth 2 (two) times a day.   0     carvedilol (COREG) 25 MG tablet Take 25 mg by mouth 2 (two) times a day with meals.       diclofenac sodium (VOLTAREN) 1 % Gel Apply topically 4 (four) times a day.       doxycycline (MONODOX) 100 MG capsule Take 1 capsule (100 mg total) by mouth 2 (two) times a day for 10  days. 20 capsule 0     ferrous sulfate (IRON) 325 (65 FE) MG tablet Take 1 tablet by mouth daily with breakfast.       fluocinonide (LIDEX) 0.05 % cream APPLY TO ITCHING RED AREAS OF SKIN TWICE DAILY AS NEEDED. DO NOT APPLY TO OPENED AREAS OF SKIN 30 g 2     fluticasone (FLONASE) 50 mcg/actuation nasal spray 1 spray into each nostril daily.       L ACIDOPHIL/B LACTIS/B LONGUM (FLORAJEN3 ORAL) Take 1 tablet by mouth daily.       lisinopril (PRINIVIL,ZESTRIL) 5 MG tablet Take 5 mg by mouth daily.       miconazole (REMEDY ANTIFUNGAL) 2 % cream Apply topically 2 (two) times a day.       omeprazole (PRILOSEC) 20 MG capsule Take 20 mg by mouth daily.       piroxicam (FELDENE) 10 MG capsule Take 10 mg by mouth daily.       potassium chloride SA (K-DUR,KLOR-CON) 20 MEQ tablet Take 1 tablet (20 mEq total) by mouth 2 (two) times a day. 30 tablet 0     simvastatin (ZOCOR) 20 MG tablet Take 20 mg by mouth bedtime.       warfarin (COUMADIN) 2.5 MG tablet Take 2.5 mg by mouth daily. Adjust dose based on INR results as directed.       Current Facility-Administered Medications   Medication Dose Route Frequency Provider Last Rate Last Dose     lidocaine 2 % jelly (XYLOCAINE)   Topical PRN Ayesha Washington NP           Allergies   Allergen Reactions     Hydromorphone Anaphylaxis and Unknown     Clindamycin Diarrhea     Cephalexin Rash       Objective:     Vitals:    11/09/17 1340   BP: 140/62   Pulse: 72   Resp: 16     Wt Readings from Last 3 Encounters:   11/09/17 191 lb (86.6 kg)   11/03/17 174 lb (78.9 kg)   08/29/17 182 lb 1.6 oz (82.6 kg)       General Appearance:   Alert, cooperative and in no acute distress.   HEENT:  No scleral icterus; the mucous membranes were pink and moist.   Neck: neck vein assessment limited as patient was examined in the chair due to safety concerns of getting on the exam table   Chest: The spine was straight. The chest was symmetric.   Lungs:   Respirations unlabored; the lungs are clear to  auscultation.   Cardiovascular:   Regular rhythm. S1 and S2 without murmur, clicks or rubs. Radial and posterior tibial pulses are intact and symmetrical.    Abdomen:  Soft, nontender, nondistended, bowel sounds present   Extremities: No cyanosis, clubbing. Moderate lower extremity edema. Wearing wraps bilaterally.   Skin: No xanthelasma.   Neurologic: Mood and affect are appropriate.         Lab Review   BMP and BNP pending          Cardiographics  Echocardiogram:8/17/2017  Summary   1. The left ventricle is normal in size. Left ventricular systolic performance is mildly reduced. The ejection fraction is estimated to be 45%.   2. There is mild global reduction in left ventricular systolic performance.   3. There is abnormal septal motion consistent with ventricular paced rhythm.  4. There is trace aortic insufficiency.   5. There is moderate mitral insufficiency.   6. There is mild-moderate, to perhaps moderate, tricuspid insufficiency.  7. Normal right ventricular size and systolic performance.   8. There is mild to moderate left atrial enlargement. There is mild right atrial enlargement.   9. Right ventricular systolic pressure relative to right atrial pressure is mildly increased.  The pulmonary artery pressure is estimated to be 40-45 mmHg plus right atrial pressure.              25 minutes were spent face to face with the patient with greater than 50% spent on education and counseling.      Brenda Herbert, [facility]   Heart Failure Clinic

## 2021-06-14 NOTE — PROGRESS NOTES
Patients thigh wounds have increased weeping bilaterally, buttocks ulcer looks improved, patient bumex was decreased to 2 MG BID.

## 2021-06-14 NOTE — PROGRESS NOTES
Assessment/Plan:     1. Cardiomyopathy with systolic dysfunction, NYHA class II: Theodore Gomes appears not compensated. He continues to have moderate lower extremity edema.  He will continue taking Bumex 4 mg in the morning and 2 mg in the afternoon.  His weights have decreased slightly.  He continues to try to follow a low-sodium diet.  BMP and magnesium pending.      Heart failure treatment includes:  - Beta blocker therapy with carvedilol 25 mg twice a day  - ACEI therapy with lisinopril 5 mg daily  - Diuretic therapy with bumetanide 4 mg in the morning and 2 mg in the afternoon    Follow-up with Dr. Marmolejo in February and in the heart failure clinic in 3 weeks or sooner if needed    Subjective:     Theodore Gomes is seen at [facility] heart failure clinic today for continued follow-up.  He follows up for mild systolic heart failure.  His wife accompanies him today.  His most recent echocardiogram was done August 17, 2017 which showed an ejection fraction of 45%.  He has a past medical history significant for hypertension, hyperlipidemia, permanent atrial fibrillation, chronic kidney disease stage III, CVA, and lymphedema.  He has a CRT-D which was implanted in May 2012 with a generator change in August 2017.    Today, he continues to have moderate lower extremity edema.  He had wraps placed bilaterally today at the vascular clinic.  He denies any shortness of breath, dyspnea on exertion, orthopnea, or PND.  He denies any other acute heart failure symptoms.  He denies lightheadedness, shortness of breath, dyspnea on exertion, orthopnea, PND, palpitations, chest pain and abdominal fullness/bloating.      He is monitoring home weights which are stable between 191-193 pounds.  He is following a low sodium diet.     Review of Systems:   General: WNL  Eyes: WNL  Ears/Nose/Throat: WNL  Lungs: WNL  Heart: WNL  Stomach: WNL  Bladder: WNL  Muscle/Joints: WNL  Skin: WNL  Nervous System: WNL  Mental Health:  WNL     Blood: WNL     Patient Active Problem List   Diagnosis     Essential hypertension     Cardiomyopathy     Permanent Atrial Fibrillation     Hyperlipidemia     Mitral Regurgitation, moderate     CKD (chronic kidney disease) stage 3, GFR 30-59 ml/min     Venous stasis     Acquired lymphedema of leg     Callus     Chronic systolic heart failure     Anemia     Chronic venous hypertension (idiopathic) with ulcer and inflammation of bilateral lower extremity     Leg ulcer, left, limited to breakdown of skin     Left foot pain     ICD (implantable cardioverter-defibrillator) battery depletion     ICD (implantable cardioverter-defibrillator), biventricular, in situ     Venous insufficiency of both lower extremities     Skin ulcer of left foot, limited to breakdown of skin     Infected wound       Past Medical History:   Diagnosis Date     Acquired lymphedema     left leg     Anemia      Arrhythmia     vtach     Atrial fibrillation      BPH (benign prostatic hyperplasia)      Cardiac defibrillator in place      Cellulitis 8/16-present    L foot , being treated by Dr Delcid at Vascular clinic     CHF (congestive heart failure)      Chronic kidney disease     ckd 3     Chronic systolic heart failure      Clostridium difficile colitis 6/29/2014     CVA, old, homonymous hemianopsia      Ejection fraction < 50% at 25%     Esophagitis      Gout      Hyperlipidemia      Hypertension      Lymphedema of both lower extremities      Nonischemic dilated cardiomyopathy      Peripheral edema     chronic with venous insufficiency     Stroke Syndrome        Past Surgical History:   Procedure Laterality Date     INSERT / REPLACE / REMOVE PACEMAKER  05/22/2012    medtronic ICD     INSERT / REPLACE / REMOVE PACEMAKER  08/22/2017    icd gen change     [place] INSJ/RPLCMT PERM DFB W/TRNSVNS LDS 1/DUAL CHMBR      Description: Cardioverter-defibrillator Pulse Generator Insertion With LV Leads;  Recorded: 06/15/2012;  Comments: 5/22/12  upgraded from ICD to BiVICD       Family History   Problem Relation Age of Onset     Early death Sister        Social History     Social History     Marital status:      Spouse name: N/A     Number of children: N/A     Years of education: N/A     Occupational History     Not on file.     Social History Main Topics     Smoking status: Former Smoker     Packs/day: 0.25     Years: 10.00     Quit date: 6/27/1958     Smokeless tobacco: Never Used     Alcohol use No     Drug use: No     Sexual activity: Yes     Partners: Female     Other Topics Concern     Not on file     Social History Narrative       Current Outpatient Prescriptions   Medication Sig Dispense Refill     acetaminophen (TYLENOL) 500 MG tablet Take 1,000 mg by mouth every 6 (six) hours as needed for pain.       allopurinol (ZYLOPRIM) 100 MG tablet Take 100 mg by mouth 2 (two) times a day.        bumetanide (BUMEX) 2 MG tablet Take 4 mg (2 tablets) in the morning and 2 mg (1 tablet) in the afternoon 90 tablet 11     carvedilol (COREG) 25 MG tablet Take 25 mg by mouth 2 (two) times a day with meals.       diclofenac sodium (VOLTAREN) 1 % Gel Apply topically 4 (four) times a day.       ferrous sulfate (IRON) 325 (65 FE) MG tablet Take 1 tablet by mouth daily with breakfast.       fluocinonide (LIDEX) 0.05 % cream APPLY TO ITCHING RED AREAS OF SKIN TWICE DAILY AS NEEDED. DO NOT APPLY TO OPENED AREAS OF SKIN 30 g 2     fluticasone (FLONASE) 50 mcg/actuation nasal spray 1 spray into each nostril daily.       L ACIDOPHIL/B LACTIS/B LONGUM (FLORAJEN3 ORAL) Take 1 tablet by mouth daily.       lisinopril (PRINIVIL,ZESTRIL) 5 MG tablet Take 5 mg by mouth daily.       miconazole (REMEDY ANTIFUNGAL) 2 % cream Apply topically 2 (two) times a day.       omeprazole (PRILOSEC) 20 MG capsule Take 20 mg by mouth daily.       piroxicam (FELDENE) 10 MG capsule Take 10 mg by mouth daily.       potassium chloride SA (K-DUR,KLOR-CON) 20 MEQ tablet Take 1 tablet (20 mEq  total) by mouth daily. 30 tablet 0     simvastatin (ZOCOR) 20 MG tablet Take 20 mg by mouth bedtime.       warfarin (COUMADIN) 2.5 MG tablet Take 2.5 mg by mouth daily. Adjust dose based on INR results as directed.       loratadine (CLARITIN) 10 mg tablet Take 10 mg by mouth daily.       Current Facility-Administered Medications   Medication Dose Route Frequency Provider Last Rate Last Dose     lidocaine 2 % jelly (XYLOCAINE)   Topical PRN Ayesha Washington NP   1 application at 11/16/17 1356       Allergies   Allergen Reactions     Hydromorphone Anaphylaxis and Unknown     Clindamycin Diarrhea     Cephalexin Rash       Objective:     Vitals:    11/20/17 1450   BP: 122/72   Pulse: 76   Resp: 16     Wt Readings from Last 3 Encounters:   11/20/17 197 lb (89.4 kg)   11/09/17 191 lb (86.6 kg)   11/03/17 174 lb (78.9 kg)       General Appearance:   Alert, cooperative and in no acute distress.   HEENT:  No scleral icterus; the mucous membranes were pink and moist.   Neck: neck vein assessment limited as patient was examined in the chair due to safety concerns of getting on the exam table   Chest: The spine was straight. The chest was symmetric.   Lungs:   Respirations unlabored; the lungs are clear to auscultation.   Cardiovascular:   Regular rhythm. S1 and S2 without murmur, clicks or rubs. Radial and posterior tibial pulses are intact and symmetrical.    Abdomen:  Soft, nontender, nondistended, bowel sounds present   Extremities: No cyanosis, clubbing. Moderate pitting edema bilateral lower extremities. Wraps present   Skin: No xanthelasma.   Neurologic: Mood and affect are appropriate.         Lab Review   BMP and Mg pending          Cardiographics  Echocardiogram: 8/17/2017  Summary   1. The left ventricle is normal in size. Left ventricular systolic performance is mildly reduced. The ejection fraction is estimated to be 45%.   2. There is mild global reduction in left ventricular systolic performance.   3. There is  abnormal septal motion consistent with ventricular paced rhythm.  4. There is trace aortic insufficiency.   5. There is moderate mitral insufficiency.   6. There is mild-moderate, to perhaps moderate, tricuspid insufficiency.  7. Normal right ventricular size and systolic performance.   8. There is mild to moderate left atrial enlargement. There is mild right atrial enlargement.   9. Right ventricular systolic pressure relative to right atrial pressure is mildly increased.  The pulmonary artery pressure is estimated to be 40-45 mmHg plus right atrial pressure.               25 minutes were spent face to face with the patient with greater than 50% spent on education and counseling.      Brenda Herbert, [facility]   Heart Failure Clinic

## 2021-06-16 PROBLEM — T14.8XXA INFECTED WOUND: Status: ACTIVE | Noted: 2017-01-01

## 2021-06-16 PROBLEM — Z95.810 ICD (IMPLANTABLE CARDIOVERTER-DEFIBRILLATOR), BIVENTRICULAR, IN SITU: Status: ACTIVE | Noted: 2017-01-01

## 2021-06-16 PROBLEM — L08.9 INFECTED WOUND: Status: ACTIVE | Noted: 2017-01-01

## 2021-06-16 PROBLEM — K92.2 GI BLEED: Status: ACTIVE | Noted: 2018-01-01

## 2021-06-16 PROBLEM — I87.2 VENOUS INSUFFICIENCY OF BOTH LOWER EXTREMITIES: Status: ACTIVE | Noted: 2017-01-01

## 2021-06-16 PROBLEM — I73.9 CLAUDICATION (H): Status: ACTIVE | Noted: 2017-01-01

## 2021-06-16 PROBLEM — L03.116 LEFT LEG CELLULITIS: Status: ACTIVE | Noted: 2017-01-01

## 2021-06-16 PROBLEM — L97.921 LEG ULCER, LEFT, LIMITED TO BREAKDOWN OF SKIN (H): Chronic | Status: ACTIVE | Noted: 2017-01-01

## 2021-06-16 PROBLEM — I87.333 CHRONIC VENOUS HYPERTENSION (IDIOPATHIC) WITH ULCER AND INFLAMMATION OF BILATERAL LOWER EXTREMITY (H): Status: ACTIVE | Noted: 2017-01-01

## 2021-06-16 PROBLEM — M79.605 LEFT LEG PAIN: Status: ACTIVE | Noted: 2017-01-01

## 2021-06-16 PROBLEM — Z45.02 ICD (IMPLANTABLE CARDIOVERTER-DEFIBRILLATOR) BATTERY DEPLETION: Status: ACTIVE | Noted: 2017-01-01

## 2021-06-16 PROBLEM — M97.12XS: Status: ACTIVE | Noted: 2018-01-01

## 2021-06-25 NOTE — PROGRESS NOTES
Progress Notes by Sixto Cartagena RN at 9/21/2017  2:30 PM     Author: Sixto Cartagena RN Service: -- Author Type: Registered Nurse    Filed: 9/21/2017  3:55 PM Encounter Date: 9/21/2017 Status: Signed    : Sixto Cartagena RN (Registered Nurse)               9/21/17left leg swelling and wounds improving. Tolerating 2 layer lite well. Also wears a darco shoe. Graft site looks great/scab.  Vasc Edema 8/28/2017 8/30/2017 9/7/2017 9/14/2017 9/21/2017   Right just above MTP 23 23 24.3 - -   Right Ankle 23 23 23.8 - -   Right Widest Calf 34.5 34.5 33.8 - -   Right Thigh Up 10cm - - - - -   Left - just above MTP 23 23 24.8 24.8 23   Left Ankle 23.5 23.5 23.7 23.7 24   Left Widest Calf 35 35 34.8 34.8 32   Left Thigh Up 10cm - - - - -   Nurse Visit      Date of Service:9/21/2017    Chief Complaint: Patient presents to clinic for evaluation of their ulcer and and swelling    Dressing on Arrival 2 layer lite/adaptic/alginate/foam      Allergies: Hydromorphone; Clindamycin; and Cephalexin    Assessment:    /60  Pulse 64  Temp 97.6  F (36.4  C) (Oral)   Resp 16    General:  Patient presents to clinic in no apparent distress.  Psychiatric:  Alert and oriented x3.   Lower extremity:  edema is present.    Integumentary:  Skin is uniformly warm, dry and pink.    Wound size:   Wound 07/20/17 left anterior foot (Active)   Pre Size Length 0.3 9/21/2017  3:00 PM   Pre Size Width 0.3 9/21/2017  3:00 PM   Pre Size Depth 0.1 9/21/2017  3:00 PM   Pre Total Sq cm 0.09 9/21/2017  3:00 PM   Post Size Length 0.7 9/14/2017 12:00 PM   Post Size Width 0.5 9/14/2017 12:00 PM   Post Size Depth 0.1 9/14/2017 12:00 PM   Post Total Sq cm 0.35 9/14/2017 12:00 PM   Undermined n 9/14/2017 12:00 PM   Tunneling n 9/14/2017 12:00 PM   Description red wound bed 8/30/2017  2:00 PM   Prodcut Used Foam 9/21/2017  3:00 PM       Wound 07/20/17 left medial ankle (Active)   Pre Size Length 0.2 9/21/2017  3:00 PM   Pre Size Width 0.2 9/21/2017  3:00  PM   Pre Size Depth 0.1 9/21/2017  3:00 PM   Pre Total Sq cm 0.04 9/21/2017  3:00 PM   Post Size Length 0.7 9/7/2017  2:00 PM   Post Size Width 0.3 9/7/2017  2:00 PM   Post Size Depth 0.1 9/7/2017  2:00 PM   Post Total Sq cm 0.21 9/7/2017  2:00 PM   Undermined n 9/14/2017 12:00 PM   Tunneling n 9/14/2017 12:00 PM   Prodcut Used Foam 9/21/2017  3:00 PM       Wound 06/29/14 Other (Comment) Arm Left;Right rash (Active)       Wound 06/29/14 Erythema Sacrum Other (Comment) red-blanchable (Active)       Incision 08/22/17 Chest Left (Active)      Undermining none.    The periwoundskin is hemosciderin, WNL, denudement, erythema, induration, maceration and or warmth      Plan:         1. Patient will Follow up 1 week with dr Ellie Delcid         2. Treatment provided:     Cleansed with: normal saline and Clinical Care/Carrklenz  Protected skin with: remedy  Applied: foam  Packed/filled with: covered with aptaptic/alginate/foam  Covered with: 2-layer lite bandage  Secured with: tape

## 2021-06-25 NOTE — PROGRESS NOTES
Progress Notes by Willa Sarmiento LPN at 7/28/2017  9:30 AM     Author: Willa Sarmiento LPN Service: -- Author Type: Licensed Nurse    Filed: 7/28/2017  9:44 AM Encounter Date: 7/28/2017 Status: Signed    : Willa Sarmiento LPN (Licensed Nurse)       Nurse Visit      Date of Service:7/28/2017    Chief Complaint: Patient presents to clinic for evaluation of their ulcer and and swelling. Pt's pain and redness is less than yesterday. Warmth is consistent with his other leg.     Dressing on Arrival: Silvercell, ABD, Tubigrip and Velcro compression wraps      Allergies: Hydromorphone; Clindamycin; and Cephalexin    Assessment:    /70  Pulse 84  Temp 97.7  F (36.5  C) (Oral)     General:  Patient presents to clinic in no apparent distress.  Psychiatric:  Alert and oriented x3.   Lower extremity:  edema is present.    Integumentary:  Skin is uniformly warm, dry and pink.    Wound size:   Wound 01/28/16 Toe Right;2nd digit (Active)   Pre Size Length 1 7/28/2017  9:00 AM   Pre Size Width 1 7/28/2017  9:00 AM   Pre Total Sq cm 1 7/28/2017  9:00 AM   Post Size Length 1.5 1/28/2016  3:00 PM   Post Size Width 1 1/28/2016  3:00 PM   Post Total Sq cm 1.5 1/28/2016  3:00 PM   Prodcut Used ABD Pad;Alginate 7/27/2017  2:00 PM       Wound 07/20/17 left anterior foot (Active)   Pre Size Length 1.5 7/28/2017  9:00 AM   Pre Size Width 1 7/28/2017  9:00 AM   Pre Total Sq cm 1.5 7/28/2017  9:00 AM   Description scattered small ulcers 7/20/2017  2:00 PM   Prodcut Used ABD Pad;Alginate 7/28/2017  9:00 AM       Wound 07/20/17 left medial ankle (Active)   Pre Size Length 1.5 7/28/2017  9:00 AM   Pre Size Width 1 7/28/2017  9:00 AM   Pre Total Sq cm 1.5 7/28/2017  9:00 AM   Prodcut Used ABD Pad;Alginate 7/28/2017  9:00 AM       Wound 07/20/17 left 2nd digit (Active)   Pre Size Length 1.2 7/28/2017  9:00 AM   Pre Size Width 0.7 7/28/2017  9:00 AM   Pre Total Sq cm 0.84 7/28/2017  9:00 AM   Prodcut Used ABD Pad;Alginate  7/27/2017  2:00 PM       Wound 06/29/14 Other (Comment) Arm Left;Right rash (Active)       Wound 06/29/14 Erythema Sacrum Other (Comment) red-blanchable (Active)      Undermining is not present.    The periwoundskin is WNL      Plan:         1. Patient will Follow up: Monday for a nurse visit to monitor status         2. Treatment provided:     Cleansed with: Clinical Care/Carrklenz  Protected skin with: Remedy skin repair  Applied: Silvercell, ABD and Conforming wrap  Packed/filled with: n/a  Covered with: ABD pads  Secured with: Kerlix, tubigrip and velcro wraps

## 2021-06-25 NOTE — PROGRESS NOTES
Progress Notes by Ellie Delcid MD at 8/16/2017  8:20 AM     Author: Ellie Delcid MD Service: -- Author Type: Physician    Filed: 8/16/2017 11:31 AM Encounter Date: 8/16/2017 Status: Signed    : Ellie Delcid MD (Physician)       Date of Service: 08/16/17    Date last seen:  07/20/17    PCP: Samantha Ledezma MD    IMPRESSION:   1. Lower extremity swelling due to venous stasis and secondary lymphedema with       dependent edema under excellent control.   2. Left leg venous ulcerations, ?wound infection-continuing with pain  3. Stasis dermatitis  4. Complicated by underlying CHF  5. On chronic anticoagulation     PLAN:   1. After permission was obtained 2% Lidocaine HCL jelly was applied, under clean conditions, the left, calf and venous stasis/insufficiency with venous hypertension ulceration(s) were debrided using currette.  Devitalized and non viable tissue, along with any fibrin and slough, was removed to improve granulation tissue formation, stimulate wound healing, decrease overall bacteria load, disrupt biofilm formation and decrease edge senescence.  Total excisional debridement was 3 sq cm from the epidermis/dermis area.   Ulcers were  improved afterwards and .  Measures were as noted on the flow sheet .  2.  Because of continued underlying concern of cellulitis and wound infection, Braxton swab culture technique was used to send off anaerobic and aerobic Gram stain and culture from the left foot ulcer.  3. Continue velcro compression on right.    4. Left leg wound care:  sorbact then algiante then abd then 2 layer light.   5. Follow up twice a week with nursing for dressing changes.  To see me in 3 weeks.    6.  Will try and clear for epifix to accelerate healing.     ---------------------------------------------------------------------------------------------------------------------     Chief Complaint: Bilateral leg swelling and increased pain left second  toe    History of Present Illness:   Theodore Gomes returns to the [facility] for follow up of bilateral leg swelling due to venous stasis and hypertension and secondary lymphedema.  He was doing very well and using his compression regularly then developed with weeping and ulcers in the front of the left leg. He was debrided and came in for regular dressing of alginate with silver, ABD and double layer tubigrip.   I had ordered 2 layer but he was switched to tubigrip 7/31/17 as he complained of discomfort.   His culture also came back positive for staph aureus and he was started on doxycycline.  He finished it on 8/3/17.  The left leg is slowly doing better, but slightly worsen since just the tubigrip.   There has been no new numbness, tingling, weakness, masses, rashes, shortness of breath or chest pain. There has been no new fevers but slight increased pain in the left foot. There are no new or changing skin lesions.       Past Medical History:   Diagnosis Date   ? Anemia    ? Atrial fibrillation    ? BPH (benign prostatic hyperplasia)    ? Cardiac defibrillator in place    ? Chronic systolic heart failure    ? Clostridium difficile colitis 6/29/2014   ? CVA, old, homonymous hemianopsia    ? Ejection fraction < 50% at 25%   ? Esophagitis    ? Hyperlipidemia    ? Hypertension    ? Peripheral edema     chronic with venous insufficiency   ? Stroke Syndrome        Past Surgical History:   Procedure Laterality Date   ? CO INSJ/RPLCMT PERM DFB W/TRNSVNS LDS 1/DUAL CHMBR      Description: Cardioverter-defibrillator Pulse Generator Insertion With LV Leads;  Recorded: 06/15/2012;  Comments: 5/22/12 upgraded from ICD to BiVICD       Current Outpatient Prescriptions   Medication Sig Dispense Refill   ? acetaminophen (TYLENOL) 500 MG tablet Take 1,000 mg by mouth every 6 (six) hours as needed for pain.     ? allopurinol (ZYLOPRIM) 100 MG tablet Take 100 mg by mouth 2 (two) times a day.      ? bumetanide (BUMEX)  2 MG tablet Take 2 mg by mouth 2 (two) times a day.   0   ? carvedilol (COREG) 25 MG tablet Take 25 mg by mouth 2 (two) times a day with meals.     ? diclofenac sodium (VOLTAREN) 1 % Gel Apply topically 4 (four) times a day.     ? fluocinonide (LIDEX) 0.05 % cream APPLY TO ITCHING RED AREAS OF SKIN TWICE DAILY AS NEEDED. DO NOT APPLY TO OPENED AREAS OF SKIN 30 g 2   ? fluticasone (FLONASE) 50 mcg/actuation nasal spray 1 spray into each nostril daily.     ? L ACIDOPHIL/B LACTIS/B LONGUM (FLORAJEN3 ORAL) Take 1 tablet by mouth daily.     ? lisinopril (PRINIVIL,ZESTRIL) 10 MG tablet Take 5 mg by mouth daily.      ? lisinopril (PRINIVIL,ZESTRIL) 5 MG tablet Take 5 mg by mouth daily.     ? miconazole (REMEDY ANTIFUNGAL) 2 % cream Apply topically 2 (two) times a day.     ? omeprazole (PRILOSEC) 20 MG capsule Take 20 mg by mouth daily.     ? piroxicam (FELDENE) 10 MG capsule Take 10 mg by mouth daily.     ? potassium chloride SA (K-DUR,KLOR-CON) 20 MEQ tablet Take 20 mEq by mouth 3 (three) times a day.     ? simvastatin (ZOCOR) 20 MG tablet Take 20 mg by mouth bedtime.     ? warfarin (COUMADIN) 2.5 MG tablet Take 2.5 mg by mouth daily. Adjust dose based on INR results as directed.       Current Facility-Administered Medications   Medication Dose Route Frequency Provider Last Rate Last Dose   ? lidocaine 2 % jelly (XYLOCAINE)   Topical PRN Ayesha Washington NP   1 application at 08/16/17 0833       Allergies   Allergen Reactions   ? Hydromorphone Anaphylaxis and Unknown   ? Clindamycin Diarrhea   ? Cephalexin Rash       Social History     Social History   ? Marital status:      Spouse name: N/A   ? Number of children: N/A   ? Years of education: N/A     Occupational History   ? Not on file.     Social History Main Topics   ? Smoking status: Former Smoker     Packs/day: 0.25     Years: 10.00     Quit date: 6/27/1958   ? Smokeless tobacco: Never Used   ? Alcohol use No   ? Drug use: No   ? Sexual activity: Yes      Partners: Female     Other Topics Concern   ? Not on file     Social History Narrative       Family History   Problem Relation Age of Onset   ? Early death Sister        Review of Systems:  Theodore Gomes no new numbess, tingling or weakness, redness or rashes, fevers, new masses, abdominal bloating or discomfort, unexplained weight loss, new ulcers, shortness of breath and chest pain  Full 12 point review of systems was completed.      Lab Results   Component Value Date/Time    PREALBUMIN 12.1 (L) 03/18/2014 05:23 AM       Culture   4+ Staphylococcus aureus (!)      Stain   1+ Polymorphonuclear leukocytes      1+ Gram positive cocci in pairs      Resulting Agency: [facility]   Susceptibility    Staphylococcus aureus     ADILIA     Cefazolin <=2  Sensitive     Clindamycin See Comment  Resistant     Doxycycline <=0.5  Sensitive     Erythromycin >4  Resistant     Levofloxacin >4  Resistant     Oxacillin 0.5  Sensitive     Trimethoprim + Sulfamethoxazole <=1/19  Sensitive     Vancomycin 1  Sensitive           Susceptibility Comments   Staphylococcus aureus   This isolate is presumed to be resistant based on detection of inducible clindamycin resistance.      Specimen Collected: 07/20/17  3:57 PM Last Resulted: 07/22/17 11:33 AM            Physical Exam:  Vitals:    08/16/17 0827   BP: 123/57   Pulse: 66   Resp: 16   Temp: 98  F (36.7  C)     BMI 27.830    Circumferential measures:    Vasc Edema 2/25/2016 9/15/2016 7/31/2017 8/9/2017 8/16/2017   Right just above MTP 25.0 23.5 23.4 23.2 23   Right Ankle 26.0 24.2 23.4 24.8 22.5   Right Widest Calf 33.2 34.2 35.4 35.5 34   Right Thigh Up 10cm - - - - -   Left - just above MTP 23.0 23.4 24 23.5 23.5   Left Ankle 26.0 25.4 24.5 26.8 26   Left Widest Calf 36.0 35.5 40 35.5 37.3   Left Thigh Up 10cm - - - - -     Measures stable.    General:  89 y.o. male in no apparent distress.  Alert and oriented x 3.  Cooperative. Affect normal.    Vascular: Dorsalis pedis and posterior tibialis  pulses are strong and equal bilaterally. There are no significant telangietasias, medial ankle venous flares, venous varicosities  and spider veins . There is normal capillary refill.     Integumentary: Skin of the legs is uniformly warm and dry and and hyperpigmentated.  There are weeping areas along the left anterior and lateral calf with slough.  These have decreased.  There is slight rubor and no calor.  Nails are thickened with decreased hair growth on toes, rubor.   There is increased pain with ulceration in the distal left dorsal foot.   Slough is present.  Please see flowsheet for measures and photos below.                 6/16/17   L med calf               L foot dorsum        Ellie Delcid MD, ABWMS, FACCWS, [facility]  Medical Director Wound Care and Lymphedema  [facility]  [phone]

## 2021-06-25 NOTE — PROGRESS NOTES
Progress Notes by Ellie Delcid MD at 11/29/2017  8:00 AM     Author: Ellie Delcid MD Service: -- Author Type: Physician    Filed: 11/29/2017 12:17 PM Encounter Date: 11/29/2017 Status: Signed    : Ellie Delcid MD (Physician)       Date of Service: 11/29/17    Date last seen:  10/30/17    PCP: Samantha Ledezma MD    IMPRESSION:   1. Lower extremity swelling due to venous stasis and secondary lymphedema with dependent edema and left leg pain.   2. Venous ulcerations bilateral legs  3.  Above worsened by underlying cardiac dysfunction  4.  Left leg cellulitis especially in groin  5. Stasis dermatitis  6. Increased venous distention left groin-?DVT  7. On chronic anticoagulation   8.  Stage 1 left buttock decubitus    PLAN:   1. After permission was obtained 2% Lidocaine HCL jelly was applied, under clean conditions, the bilateral calves venous insufficiency ulcerationswere debrided using currette.  Devitalized and non viable tissue, along with eschar, and significant crusting any fibrin and slough, was removed to improve granulation tissue formation, stimulate wound healing, decrease overall bacteria load, disrupt biofilm formation and decrease edge senescence.  Total excisional debridement was 5 sq cm from the epidermis/dermis area.   Ulcers were  improved afterwards and .  Measures were as noted on the flow sheet .  Significant amount of crusting was removed.  2.  Because of underlying concern of cellulitis and wound infection, Braxton swab culture technique was used to send off anaerobic and aerobic Gram stain and culture from the left medial distal shin ulcer.  3. I will empirically start him on doxycycline.  Culture pending.  If temp > =100.5 then to ER.  Discussed.  4. Wound treatment will include irrigation and dressings to promote autolytic debridement and will be: adaptic then alginate then abd then velcro compression.  Family to change when moist.  Just use kerlix to  "secure proximal left medial thigh ulcerations/weeping areas.   DO  NOT WRAP TIGHTLY.  Just use foam with silicon border on buttock to protect.    5.  With venous distention, recurrent infection will check US to r/o DVT left leg.  Also will get LESVIA's in view of continuing left second toe callus and increased pain in left leg.  With cardiac problems at high risk of PAD and this will help direct compression and cares.  (last done 2011)  6.  Follow up in 2 weeks with me,  or when needed.     ---------------------------------------------------------------------------------------------------------------------     Chief Complaint: Bilateral leg swelling and venous ulcerations    History of Present Illness:   Theodore Gomes and his wife return to the [facility] for follow up of bilateral leg swelling with ulcerations due to venous stasis and hypertension and secondary lymphedema.  He has been having problems with recurring venous stasis ulcers worsened by underlying cardiomyopathy and cardiac problems with variable fluid retension.  His cardiologist has been adjusting his diuretics and he is now doing better.  Dressings have been  Cleansing with Remedy skin cleanser then apply Remedy lotion then Silver alginate then cover with ABD pads and secure with 2 layer lite wrap.  He has been coming in weekly for dressing changes. He and his wife report the wounds are better on the legs.  They have the velcro compression at home.  There has been no new numbness, tingling, weakness, masses, rashes, shortness of breath or chest pain. There has been no new fevers. There are no new or changing skin lesions.  He reports he has had a long term buttock ulcer that he has not allowed us to treat in the past, but now is requesting me to look at.   It has been there \"for years\".  He used to sit on a donut cushion, but noted this does not help.  He no longer uses it.      Past Medical History:   Diagnosis Date   ? Acquired " lymphedema     left leg   ? Anemia    ? Arrhythmia     vtach   ? Atrial fibrillation    ? BPH (benign prostatic hyperplasia)    ? Cardiac defibrillator in place    ? Cellulitis 8/16-present    L foot , being treated by Dr Delcid at [facility]   ? CHF (congestive heart failure)    ? Chronic kidney disease     ckd 3   ? Chronic systolic heart failure    ? Clostridium difficile colitis 6/29/2014   ? CVA, old, homonymous hemianopsia    ? Ejection fraction < 50% at 25%   ? Esophagitis    ? Gout    ? Hyperlipidemia    ? Hypertension    ? Lymphedema of both lower extremities    ? Nonischemic dilated cardiomyopathy    ? Peripheral edema     chronic with venous insufficiency   ? Stroke Syndrome        Past Surgical History:   Procedure Laterality Date   ? INSERT / REPLACE / REMOVE PACEMAKER  05/22/2012    medtronic ICD   ? INSERT / REPLACE / REMOVE PACEMAKER  08/22/2017    icd gen change   ? SD INSJ/RPLCMT PERM DFB W/TRNSVNS LDS 1/DUAL CHMBR      Description: Cardioverter-defibrillator Pulse Generator Insertion With LV Leads;  Recorded: 06/15/2012;  Comments: 5/22/12 upgraded from ICD to BiVICD       Current Outpatient Prescriptions   Medication Sig Dispense Refill   ? acetaminophen (TYLENOL) 500 MG tablet Take 1,000 mg by mouth every 6 (six) hours as needed for pain.     ? allopurinol (ZYLOPRIM) 100 MG tablet Take 100 mg by mouth 2 (two) times a day.      ? bumetanide (BUMEX) 2 MG tablet Take 4 mg (2 tablets) in the morning and 2 mg (1 tablet) in the afternoon (Patient taking differently: 2 mg 2 (two) times a day at 9am and 6pm. Take 2 mg (2 tablets) in the morning and 2 mg (1 tablet) in the afternoon) 90 tablet 11   ? carvedilol (COREG) 25 MG tablet Take 25 mg by mouth 2 (two) times a day with meals.     ? diclofenac sodium (VOLTAREN) 1 % Gel Apply topically 4 (four) times a day.     ? ferrous sulfate (IRON) 325 (65 FE) MG tablet Take 1 tablet by mouth daily with breakfast.     ? fluocinonide (LIDEX) 0.05 % cream  APPLY TO ITCHING RED AREAS OF SKIN TWICE DAILY AS NEEDED. DO NOT APPLY TO OPENED AREAS OF SKIN 30 g 2   ? fluticasone (FLONASE) 50 mcg/actuation nasal spray 1 spray into each nostril daily.     ? L ACIDOPHIL/B LACTIS/B LONGUM (FLORAJEN3 ORAL) Take 1 tablet by mouth daily.     ? lisinopril (PRINIVIL,ZESTRIL) 5 MG tablet Take 5 mg by mouth daily.     ? loratadine (CLARITIN) 10 mg tablet Take 10 mg by mouth daily.     ? miconazole (REMEDY ANTIFUNGAL) 2 % cream Apply topically 2 (two) times a day.     ? omeprazole (PRILOSEC) 20 MG capsule Take 20 mg by mouth daily.     ? piroxicam (FELDENE) 10 MG capsule Take 10 mg by mouth daily.     ? simvastatin (ZOCOR) 20 MG tablet Take 20 mg by mouth bedtime.     ? warfarin (COUMADIN) 2.5 MG tablet Take 2.5 mg by mouth daily. Adjust dose based on INR results as directed.     ? doxycycline (MONODOX) 100 MG capsule Take 1 capsule (100 mg total) by mouth 2 (two) times a day for 10 days. 20 capsule 0     Current Facility-Administered Medications   Medication Dose Route Frequency Provider Last Rate Last Dose   ? lidocaine 2 % jelly (XYLOCAINE)   Topical PRN Ayesha Washington NP   1 application at 11/16/17 0746       Allergies   Allergen Reactions   ? Hydromorphone Anaphylaxis and Unknown   ? Clindamycin Diarrhea   ? Cephalexin Rash       Social History     Social History   ? Marital status:      Spouse name: N/A   ? Number of children: N/A   ? Years of education: N/A     Occupational History   ? Not on file.     Social History Main Topics   ? Smoking status: Former Smoker     Packs/day: 0.25     Years: 10.00     Quit date: 6/27/1958   ? Smokeless tobacco: Never Used   ? Alcohol use No   ? Drug use: No   ? Sexual activity: Yes     Partners: Female     Other Topics Concern   ? Not on file     Social History Narrative       Family History   Problem Relation Age of Onset   ? Early death Sister        Review of Systems:  Theodore Gomes no new numbess, tingling or weakness, redness or  rashes, fevers, new masses, abdominal bloating or discomfort, unexplained weight loss, new ulcers, shortness of breath and chest pain  Full 12 point review of systems was completed.      Lab Results   Component Value Date/Time    PREALBUMIN 12.1 (L) 03/18/2014 05:23 AM       Status:  Final result   Visible to patient:  No (Not Released) Next appt:  11/28/2017 at 12:00 AM in Cardiology (Remote Device Check From Home) Dx:  Chronic venous hypertension (idiopath...   Culture   2+ Staphylococcus aureus (!)      2+ Enterococcus faecalis (!)      1+ Enterococcus faecium (!)      1+ Coagulase negative Staphylococcus (!)      Stain   No polymorphonuclear leukocytes seen      1+ Gram negative bacilli      Resulting Agency: [facility]   Susceptibility    Staphylococcus aureus     ADILIA     Cefazolin <=2  Sensitive     Clindamycin See Comment  Resistant     Doxycycline <=0.5  Sensitive     Erythromycin >4  Resistant     Levofloxacin >4  Resistant     Oxacillin 0.5  Sensitive     Trimethoprim + Sulfamethoxazole <=1/19  Sensitive     Vancomycin 1  Sensitive           Susceptibility    Enterococcus faecalis     ADILIA     Ampicillin 1  Sensitive           Susceptibility    Enterococcus faecium     ADILIA     Ampicillin 1  Sensitive           Susceptibility Comments   Staphylococcus aureus   This isolate is presumed to be resistant based on detection of inducible clindamycin resistance.              Imaging:    CT ABDOMEN PELVIS WO ORAL WO IV CONTRAST  6/30/2014 8:08 AM     INDICATION: Diarrhea.  TECHNIQUE: Routine CT abdomen and pelvis without oral or IV contrast. Coronal reconstruction.  COMPARISON: None.     FINDINGS:  LUNG BASES: Diffuse pleural thickening right lower lobe. Scarring or atelectasis right base. Pacemaker lead tips right atrium and right ventricle.     ABDOMEN: The liver, spleen, pancreas, and adrenal glands are normal. Small amount of ascites adjacent to the spleen. Distended gallbladder, which contains several stones.  Bilateral renal cortical atrophy. Bilateral renal cysts, the largest measuring 2.9   cm. Atherosclerotic disease abdominal aorta. Atheromatous plaque within the superior mesenteric artery.     PELVIS: Diffuse wall thickening involving the cecum and ascending colon. There are also scattered diverticula and some adjacent pericolonic soft tissue stranding. There is additional mild soft tissue stranding adjacent to the left colon with scattered   diverticula. The distal sigmoid colon and rectum are unremarkable. No evidence for obstruction. Marked prostatic hypertrophy. Numerous surgical clips right lower quadrant. Advanced atherosclerotic disease.     MUSCULOSKELETAL: Internal fixation right hip. Old compression fracture T12 vertebral body.     IMPRESSION:   CONCLUSION:  1.  Soft tissue stranding along the course of most of the colon suggesting a diffuse colitis, likely infectious or inflammatory. This may account for more pronounced wall thickening involving the cecum and ascending colon. Neoplasm at this location   however, not completely excluded. Recommend a followup exam after therapy to assess for resolution.  2.  Gallstones.  3.  Bilateral renal cortical atrophy and cysts.  4.  Marked prostatic hypertrophy.  5.  Advanced atherosclerotic disease.  6.  Presumed chronic pleural thickening right lower lobe.      Physical Exam:  Vitals:    11/29/17 0754   BP: 127/73   Pulse: 70   Resp: 16   Temp: 97.9  F (36.6  C)     BMI 28.06 (increased)    Circumferential measures:    Vasc Edema 11/7/2017 11/16/2017 11/20/2017 11/24/2017 11/29/2017   Right just above MTP - 24 24.4 24.3 24.2   Right Ankle - 26 24.4 25 26.2   Right Widest Calf - 42 41 36.6 34.5   Right Thigh Up 10cm - - - 55.5 53.8   Left - just above MTP 24 24 23.5 23.5 24.5   Left Ankle 23.5 23 23.8 25.5 24.3   Left Widest Calf 35.7 35 37.6 36 35.5   Left Thigh Up 10cm - - - 53 -     Measures stable and under good control.    General:  89 y.o. male in no apparent  distress.  Alert and oriented x 3.  Cooperative. Affect normal.    Vascular:  There are no significant telangietasias, medial ankle venous flares, venous varicosities  and spider veins .      Integumentary: Skin of the legs is uniformly warm and dry and and hyperpigmentated. There is severe macular erythema in the left groin which extends to the left medial and lateral thigh with pain to palpation and weeping in the left medial upper thigh.  Left and right calves have multiple small ulcerations with eschar and weeping.  Slough is noted in all areas with slight keli wound maceration. There is crusting and pain to palpation of the left leg.   Drainage is serous with no malodor. There is also some calor in the left leg.   Nails are thickened with decreased hair growth on toes, rubor.    Buttock ulcer on left cheek is stage one and slightly painful, but closed.  The foot ulcerations have closed.   Please see flowsheet for measures.                   6/16/17   L med calf                     8/30/17      Ellie Delcid MD, ABWMS, FACCWS, [facility]  Medical Director Wound Care and Lymphedema  [facility]  [phone]

## 2021-06-25 NOTE — PROGRESS NOTES
Progress Notes by Joceline De La Torre LPN at 8/18/2017 10:30 AM     Author: Joceline De La Torre LPN Service: -- Author Type: Licensed Nurse    Filed: 8/18/2017 12:17 PM Encounter Date: 8/18/2017 Status: Signed    : Joceline De La Torre LPN (Licensed Nurse)               Nurse Visit      Date of Service:8/18/2017    Chief Complaint: Patient presents to clinic for evaluation of their ulcer and and swelling    Dressing on Arrival intact sorbact/alginate/abd/2 layer lite      Allergies: Hydromorphone; Clindamycin; and Cephalexin    Assessment:    /70  Pulse 76  Temp 98  F (36.7  C) (Oral)   Resp 14    General:  Patient presents to clinic in no apparent distress.  Psychiatric:  Alert and oriented x3.   Lower extremity:  edema is present.    Integumentary:  Skin is uniformly warm, dry and pink.    Wound size:   Wound 01/28/16 Toe Right;2nd digit (Active)   Pre Size Length 0.5 8/16/2017  8:00 AM   Pre Size Width 0.5 8/16/2017  8:00 AM   Pre Total Sq cm 0.25 8/16/2017  8:00 AM   Post Size Length 1.5 1/28/2016  3:00 PM   Post Size Width 1 1/28/2016  3:00 PM   Post Total Sq cm 1.5 1/28/2016  3:00 PM   Description scab 8/16/2017  8:00 AM   Prodcut Used ABD Pad;Alginate 7/27/2017  2:00 PM       Wound 07/20/17 left anterior foot (Active)   Pre Size Length 1.4 8/18/2017 11:00 AM   Pre Size Width 1 8/18/2017 11:00 AM   Pre Size Depth 0.1 8/18/2017 11:00 AM   Pre Total Sq cm 1.4 8/18/2017 11:00 AM   Post Size Length 1 8/9/2017  3:00 PM   Post Size Width 0.5 8/9/2017  3:00 PM   Undermined N 8/18/2017 11:00 AM   Tunneling N 8/18/2017 11:00 AM   Description RED WOUND BD 8/18/2017 11:00 AM   Prodcut Used ABD Pad 8/18/2017 11:00 AM       Wound 07/20/17 left medial ankle (Active)   Pre Size Length 0.9 8/18/2017 11:00 AM   Pre Size Width 1.5 8/18/2017 11:00 AM   Pre Size Depth 0.2 8/18/2017 11:00 AM   Pre Total Sq cm 1.35 8/18/2017 11:00 AM   Prodcut Used ABD Pad;Alginate 7/28/2017  9:00 AM       Wound 07/31/17 left posterior leg  (Active)   Pre Size Length 0.4 8/18/2017 11:00 AM   Pre Size Width 0.3 8/18/2017 11:00 AM   Pre Total Sq cm 0.12 8/18/2017 11:00 AM   Undermined N 8/18/2017 11:00 AM   Tunneling N 8/18/2017 11:00 AM   Description RED WOUND BED 8/18/2017 11:00 AM   Prodcut Used ABD Pad 8/18/2017 11:00 AM       Wound 06/29/14 Other (Comment) Arm Left;Right rash (Active)       Wound 06/29/14 Erythema Sacrum Other (Comment) red-blanchable (Active)      Undermining is not present.  The periwoundskin is wnl but shows hemosiderin staining.    Plan:         1. Patient will Follow up as previously scheduled         2. Treatment provided:     Cleansed with: normal saline  Applied: sorbact  Covered with: abd/roll gauze  Secured with: 2 layer lite

## 2021-06-25 NOTE — PROGRESS NOTES
"Progress Notes by Willa Sarmiento LPN at 11/3/2017  2:00 PM     Author: Willa Sarmiento LPN Service: -- Author Type: Licensed Nurse    Filed: 11/3/2017  2:24 PM Encounter Date: 11/3/2017 Status: Signed    : Willa Sarmiento LPN (Licensed Nurse)         Left medial leg    Left anterior leg      Nurse Visit    Date of Service:11/3/2017    Chief Complaint: Patient presents to clinic for evaluation of their ulcer and and swelling    Dressing on Arrival: 2 layer lite on left and calcium alginate      Allergies: Hydromorphone; Clindamycin; and Cephalexin    Assessment:    /68 (Patient Site: Left Arm, Patient Position: Sitting)  Pulse 84  Temp 98  F (36.7  C) (Oral)   Resp 16  Ht 5' 9\" (1.753 m)  Wt 174 lb (78.9 kg)  BMI 25.7 kg/m2    General:  Patient presents to clinic in no apparent distress.  Psychiatric:  Alert and oriented x3.   Lower extremity:  edema is present.    Integumentary:  Skin is uniformly warm, dry and pink.    Wound size:   Wound 07/20/17 left anterior foot (Active)   Pre Size Length 0.5 11/3/2017  2:00 PM   Pre Size Width 0.3 11/3/2017  2:00 PM   Pre Size Depth 0.1 9/21/2017  3:00 PM   Pre Total Sq cm 0.15 11/3/2017  2:00 PM   Post Size Length 0 9/28/2017 12:00 PM   Post Size Width 0 9/28/2017 12:00 PM   Post Size Depth 0 9/28/2017 12:00 PM   Post Total Sq cm 0.35 9/14/2017 12:00 PM   Undermined n 9/14/2017 12:00 PM   Tunneling n 9/14/2017 12:00 PM   Description red wound bed 8/30/2017  2:00 PM   Prodcut Used Foam 9/21/2017  3:00 PM       Wound 07/20/17 left medial ankle (Active)   Pre Size Length 0 11/3/2017  2:00 PM   Pre Size Width 0 11/3/2017  2:00 PM   Pre Size Depth 0 11/3/2017  2:00 PM   Pre Total Sq cm 0 11/3/2017  2:00 PM   Post Size Length 0.7 9/7/2017  2:00 PM   Post Size Width 0.3 9/7/2017  2:00 PM   Post Size Depth 0.1 9/7/2017  2:00 PM   Post Total Sq cm 0.21 9/7/2017  2:00 PM   Undermined n 9/14/2017 12:00 PM   Tunneling n 9/14/2017 12:00 PM   Prodcut Used " Foam 9/21/2017  3:00 PM       Wound 11/03/17 left medial leg superior (Active)   Pre Size Length 0.5 11/3/2017  2:00 PM   Pre Size Width 0.5 11/3/2017  2:00 PM   Pre Total Sq cm 0.25 11/3/2017  2:00 PM       Wound 11/03/17 left medial leg inferior (Active)   Pre Size Length 0.5 11/3/2017  2:00 PM   Pre Size Width 0.5 11/3/2017  2:00 PM   Pre Total Sq cm 0.25 11/3/2017  2:00 PM       Wound 06/29/14 Other (Comment) Arm Left;Right rash (Active)       Wound 06/29/14 Erythema Sacrum Other (Comment) red-blanchable (Active)       Incision 08/22/17 Chest Left (Active)      Undermining is not present.    The periwoundskin is erythema      Plan:         1. Patient will Follow up 11/7/17         2. Treatment provided: dressing change orders     Cleansed with: Remedy skin cleanser  Protected skin with: remedy lotion to intact skin  Applied: silvercel  Packed/filled with: NA  Covered with: ABD pads  Secured with: 2 layer lite on left

## 2021-06-25 NOTE — PROGRESS NOTES
Progress Notes by Ellie Delcid MD at 9/14/2017 11:20 AM     Author: Ellie Delcid MD Service: -- Author Type: Physician    Filed: 9/14/2017  2:32 PM Encounter Date: 9/14/2017 Status: Signed    : Ellie Delcid MD (Physician)       Date of Service: 09/14/17    Date last seen:  09/07kl/17    PCP: Samantha Ledezma MD    IMPRESSION:   1. Lower extremity swelling due to venous stasis and secondary lymphedema with dependent edema under excellent control.   2. Left leg and foot venous ulcerations-improving nicely (77% decrease in size since last visit)-epifix #2 placed  3. Stasis dermatitis  4. Complicated by underlying CHF  5. On chronic anticoagulation     PLAN:   1. After risks were reviewed including infection, allergic reaction and scarring consent was received.  The left, calf, foot and venous stasis/insufficiency with venous hypertension ulceration(s)was/ were debrided with clean technique.  curette and was used to remove slough and fibrin deposition along with devitalized tissue.  This was to improve granulation tissue formation, stimulate wound healing, decrease biofilm formation, decrease bacterial load, decrease senescent edges and prepare for bioengineered graft placement.  A total of 1 square centimeters was debrided from the epidermis/dermis.  A 18mm piece of Epifix was prepared according to 's directions.  It was cut and fit on the ulceration left foot.  It was secured with  veil conformant and Steri-Strips. No square centimeters were wasted.  After the primary dressing was placed the secondary dressing of alginate then foam followed by two layer lite was used. Adaptic then foam over near healed left distal medial ulceration.  2.  Continue velcro compression on right.  3.  Follow up in one week.       ---------------------------------------------------------------------------------------------------------------------     Chief Complaint: Bilateral leg swelling and  venous ulcerations    History of Present Illness:   Theodore Gomes and his wife return to the [facility] for follow up of bilateral leg swelling with ulcerations due to venous stasis and hypertension and secondary lymphedema.  He was doing very well and using his compression regularly then developed with weeping and ulcers in the front of the left leg. He was debrided and came in for regular dressing of alginate with silver, ABD and double layer tubigrip.   I had ordered 2 layer but he was switched to tubigrip 7/31/17 as he complained of discomfort.   His culture also came back positive for staph aureus and he was started on doxycycline.  He finished it on 8/3/17.  The left leg was slowly doing better.  Debridement was done with wound care of sorbact then alginate then abd then 2 layer light. .  Culture showed staph and e. Faecalis.  His doxycycline was restarted.   The pain became less and the wounds seem smaller.  He was cleared for epi-fix.  His first epi-fix was placed 1 week ago.  He comes in for follow-up.  He has noticed decrease pain.  There has been no increased drainage. There has been no new numbness, tingling, weakness, masses, rashes, shortness of breath or chest pain. There has been no new fevers. There are no new or changing skin lesions.       Past Medical History:   Diagnosis Date   ? Acquired lymphedema     left leg   ? Anemia    ? Arrhythmia     vtach   ? Atrial fibrillation    ? BPH (benign prostatic hyperplasia)    ? Cardiac defibrillator in place    ? Cellulitis 8/16-present    L foot , being treated by Dr Delcid at [facility]   ? CHF (congestive heart failure)    ? Chronic kidney disease     ckd 3   ? Chronic systolic heart failure    ? Clostridium difficile colitis 6/29/2014   ? CVA, old, homonymous hemianopsia    ? Ejection fraction < 50% at 25%   ? Esophagitis    ? Gout    ? Hyperlipidemia    ? Hypertension    ? Lymphedema of both lower extremities    ? Nonischemic  dilated cardiomyopathy    ? Peripheral edema     chronic with venous insufficiency   ? Stroke Syndrome        Past Surgical History:   Procedure Laterality Date   ? INSERT / REPLACE / REMOVE PACEMAKER  05/22/2012    medtronic ICD   ? INSERT / REPLACE / REMOVE PACEMAKER  08/22/2017    icd gen change   ? WI INSJ/RPLCMT PERM DFB W/TRNSVNS LDS 1/DUAL CHMBR      Description: Cardioverter-defibrillator Pulse Generator Insertion With LV Leads;  Recorded: 06/15/2012;  Comments: 5/22/12 upgraded from ICD to BiVICD       Current Outpatient Prescriptions   Medication Sig Dispense Refill   ? acetaminophen (TYLENOL) 500 MG tablet Take 1,000 mg by mouth every 6 (six) hours as needed for pain.     ? allopurinol (ZYLOPRIM) 100 MG tablet Take 100 mg by mouth 2 (two) times a day.      ? bumetanide (BUMEX) 2 MG tablet Take 2 mg by mouth 2 (two) times a day.   0   ? carvedilol (COREG) 25 MG tablet Take 25 mg by mouth 2 (two) times a day with meals.     ? diclofenac sodium (VOLTAREN) 1 % Gel Apply topically 4 (four) times a day.     ? fluocinonide (LIDEX) 0.05 % cream APPLY TO ITCHING RED AREAS OF SKIN TWICE DAILY AS NEEDED. DO NOT APPLY TO OPENED AREAS OF SKIN 30 g 2   ? fluticasone (FLONASE) 50 mcg/actuation nasal spray 1 spray into each nostril daily.     ? L ACIDOPHIL/B LACTIS/B LONGUM (FLORAJEN3 ORAL) Take 1 tablet by mouth daily.     ? lisinopril (PRINIVIL,ZESTRIL) 10 MG tablet Take 5 mg by mouth daily.      ? lisinopril (PRINIVIL,ZESTRIL) 5 MG tablet Take 5 mg by mouth daily.     ? miconazole (REMEDY ANTIFUNGAL) 2 % cream Apply topically 2 (two) times a day.     ? omeprazole (PRILOSEC) 20 MG capsule Take 20 mg by mouth daily.     ? piroxicam (FELDENE) 10 MG capsule Take 10 mg by mouth daily.     ? potassium chloride SA (K-DUR,KLOR-CON) 20 MEQ tablet Take 20 mEq by mouth 3 (three) times a day.     ? simvastatin (ZOCOR) 20 MG tablet Take 20 mg by mouth bedtime.     ? warfarin (COUMADIN) 2.5 MG tablet Take 2.5 mg by mouth daily.  Adjust dose based on INR results as directed.       Current Facility-Administered Medications   Medication Dose Route Frequency Provider Last Rate Last Dose   ? lidocaine 2 % jelly (XYLOCAINE)   Topical PRN Ayesha Washington NP   1 application at 09/07/17 1446       Allergies   Allergen Reactions   ? Hydromorphone Anaphylaxis and Unknown   ? Clindamycin Diarrhea   ? Cephalexin Rash       Social History     Social History   ? Marital status:      Spouse name: N/A   ? Number of children: N/A   ? Years of education: N/A     Occupational History   ? Not on file.     Social History Main Topics   ? Smoking status: Former Smoker     Packs/day: 0.25     Years: 10.00     Quit date: 6/27/1958   ? Smokeless tobacco: Never Used   ? Alcohol use No   ? Drug use: No   ? Sexual activity: Yes     Partners: Female     Other Topics Concern   ? Not on file     Social History Narrative       Family History   Problem Relation Age of Onset   ? Early death Sister        Review of Systems:  Theodore Gomes no new numbess, tingling or weakness, redness or rashes, fevers, new masses, abdominal bloating or discomfort, unexplained weight loss, new ulcers, shortness of breath and chest pain  Full 12 point review of systems was completed.      Lab Results   Component Value Date/Time    PREALBUMIN 12.1 (L) 03/18/2014 05:23 AM       Status:  Final result   Visible to patient:  No (Not Released) Next appt:  11/28/2017 at 12:00 AM in Cardiology (Remote Device Check From Home) Dx:  Chronic venous hypertension (idiopath...   Culture   2+ Staphylococcus aureus (!)      2+ Enterococcus faecalis (!)      1+ Enterococcus faecium (!)      1+ Coagulase negative Staphylococcus (!)      Stain   No polymorphonuclear leukocytes seen      1+ Gram negative bacilli      Resulting Agency[facility]   Susceptibility    Staphylococcus aureus     ADILIA     Cefazolin <=2  Sensitive     Clindamycin See Comment  Resistant     Doxycycline <=0.5  Sensitive     Erythromycin >4   Resistant     Levofloxacin >4  Resistant     Oxacillin 0.5  Sensitive     Trimethoprim + Sulfamethoxazole <=1/19  Sensitive     Vancomycin 1  Sensitive           Susceptibility    Enterococcus faecalis     ADILIA     Ampicillin 1  Sensitive           Susceptibility    Enterococcus faecium     ADILIA     Ampicillin 1  Sensitive           Susceptibility Comments   Staphylococcus aureus   This isolate is presumed to be resistant based on detection of inducible clindamycin resistance.              Physical Exam:  Vitals:    09/14/17 1214   BP: 120/67   Pulse: 80   Resp: 18   Temp: 97.7  F (36.5  C)     BMI 26.89 (decreased)    Circumferential measures:    Vasc Edema 8/24/2017 8/28/2017 8/30/2017 9/7/2017 9/14/2017   Right just above MTP 23 23 23 24.3 -   Right Ankle 23 23 23 23.8 -   Right Widest Calf 34.5 34.5 34.5 33.8 -   Right Thigh Up 10cm - - - - -   Left - just above MTP 23 23 23 24.8 24.8   Left Ankle 23.5 23.5 23.5 23.7 23.7   Left Widest Calf 35 35 35 34.8 34.8   Left Thigh Up 10cm - - - - -     Measures stable and under good control.    General:  89 y.o. male in no apparent distress.  Alert and oriented x 3.  Cooperative. Affect normal.    Vascular: Dorsalis pedis and posterior tibialis pulses are strong and equal bilaterally. There are no significant telangietasias, medial ankle venous flares, venous varicosities  and spider veins . There is normal capillary refill.     Integumentary: Skin of the legs is uniformly warm and dry and and hyperpigmentated.  The ulcerations along the left medial calf and the left lateral dorsum of the left foot have some crusting. When this was removed the ulcerations have improved greatly.  Left medial calf measures is essentially closed.  The left lateral dorsum of the left foot measures 0.7 x 0.5 cm.  Depth has decreased and there appears to be improved epithelization from the periphery with flattening of the edges.   They are much smaller.  There is no odor.  There is no discharge.   It was noted that there was no pain with debridement today.   There is no rubor and no calor.   Nails are thickened with decreased hair growth on toes, rubor.     Please see flowsheet for measures and photos below.                 6/16/17   L med calf               L foot dorsum               8/30/17 L med calf                 L foot dorsum    Ellie Delcid MD, ABWMS, FACCWS, [facility]  Medical Director Wound Care and Lymphedema  [facility]  [phone]

## 2021-06-25 NOTE — PROGRESS NOTES
Progress Notes by Ellie Delcid MD at 8/30/2017  3:00 PM     Author: Ellie Delcid MD Service: -- Author Type: Physician    Filed: 8/30/2017  5:31 PM Encounter Date: 8/30/2017 Status: Signed    : Ellie Delcid MD (Physician)       Date of Service: 08/30/17    Date last seen:  08/16/17    PCP: Samantha Ledezma MD    IMPRESSION:   1. Lower extremity swelling due to venous stasis and secondary lymphedema with       dependent edema under excellent control.   2. Left leg and foot venous ulcerations-minimal improvement-epifix #1 placed  3. Stasis dermatitis  4. Complicated by underlying CHF  5. On chronic anticoagulation     PLAN:   1. After risks were reviewed including infection, allergic reaction and scarring consent was received.  The left, calf, foot and venous stasis/insufficiency with venous hypertension ulceration(s)was/ were debrided with clean technique.  curette and was used to remove slough and fibrin deposition along with devitalized tissue.  This was to improve granulation tissue formation, stimulate wound healing, decrease biofilm formation, decrease bacterial load, decrease senescent edges and prepare for bioengineered graft placement.  A total of 3.8 square centimeters was debrided from the subcutaneous tissue.  A 2 X 2 piece of Epifix was prepared according to 's directions.  It was cut and fit on the ulceration.  It was secured with  veil conformant and Steri-Strips. No square centimeters were wasted.  After the primary dressing was placed the secondary dressing of alginate then foam followed by two layer lite was used.  2.  Continue velcro compression on right.  3.  Follow up in one week.      ---------------------------------------------------------------------------------------------------------------------     Chief Complaint: Bilateral leg swelling and venous ulcerations    History of Present Illness:   Theodore Gomes and his wife return to the [facility] for follow up of bilateral leg swelling with ulcerations due to venous stasis and hypertension and secondary lymphedema.  He was doing very well and using his compression regularly then developed with weeping and ulcers in the front of the left leg. He was debrided and came in for regular dressing of alginate with silver, ABD and double layer tubigrip.   I had ordered 2 layer but he was switched to tubigrip 7/31/17 as he complained of discomfort.   His culture also came back positive for staph aureus and he was started on doxycycline.  He finished it on 8/3/17.  The left leg was slowly doing better.  Debridement was done with wound care of sorbact then alginate then abd then 2 layer light. .  Culture showed staph and e. Faecalis.  His doxycycline was restarted. He is still on this and the leg is better.  The pain is less and the wounds seem smaller.  He has been cleared for epi-fix.  There has been no new numbness, tingling, weakness, masses, rashes, shortness of breath or chest pain. There has been no new fevers. There are no new or changing skin lesions.       Past Medical History:   Diagnosis Date   ? Acquired lymphedema     left leg   ? Anemia    ? Arrhythmia     vtach   ? Atrial fibrillation    ? BPH (benign prostatic hyperplasia)    ? Cardiac defibrillator in place    ? Cellulitis 8/16-present    L foot , being treated by Dr Delcid at [facility]ascular clinic   ? CHF (congestive heart failure)    ? Chronic kidney disease     ckd 3   ? Chronic systolic heart failure    ? Clostridium difficile colitis 6/29/2014   ? CVA, old, homonymous hemianopsia    ? Ejection fraction < 50% at 25%   ? Esophagitis    ? Gout    ? Hyperlipidemia    ? Hypertension    ? Lymphedema of both lower extremities    ? Nonischemic dilated cardiomyopathy    ? Peripheral edema     chronic with venous insufficiency   ? Stroke Syndrome        Past Surgical History:   Procedure Laterality Date   ? INSERT / REPLACE / REMOVE PACEMAKER   05/22/2012    medtronic ICD   ? INSERT / REPLACE / REMOVE PACEMAKER  08/22/2017    icd gen change   ? [place] INSJ/RPLCMT PERM DFB W/TRNSVNS LDS 1/DUAL CHMBR      Description: Cardioverter-defibrillator Pulse Generator Insertion With LV Leads;  Recorded: 06/15/2012;  Comments: 5/22/12 upgraded from ICD to BiVICD       Current Outpatient Prescriptions   Medication Sig Dispense Refill   ? acetaminophen (TYLENOL) 500 MG tablet Take 1,000 mg by mouth every 6 (six) hours as needed for pain.     ? allopurinol (ZYLOPRIM) 100 MG tablet Take 100 mg by mouth 2 (two) times a day.      ? bumetanide (BUMEX) 2 MG tablet Take 2 mg by mouth 2 (two) times a day.   0   ? carvedilol (COREG) 25 MG tablet Take 25 mg by mouth 2 (two) times a day with meals.     ? diclofenac sodium (VOLTAREN) 1 % Gel Apply topically 4 (four) times a day.     ? doxycycline (MONODOX) 100 MG capsule Take 1 capsule (100 mg total) by mouth 2 (two) times a day for 10 days. 20 capsule 0   ? fluocinonide (LIDEX) 0.05 % cream APPLY TO ITCHING RED AREAS OF SKIN TWICE DAILY AS NEEDED. DO NOT APPLY TO OPENED AREAS OF SKIN 30 g 2   ? fluticasone (FLONASE) 50 mcg/actuation nasal spray 1 spray into each nostril daily.     ? L ACIDOPHIL/B LACTIS/B LONGUM (FLORAJEN3 ORAL) Take 1 tablet by mouth daily.     ? lisinopril (PRINIVIL,ZESTRIL) 10 MG tablet Take 5 mg by mouth daily.      ? lisinopril (PRINIVIL,ZESTRIL) 5 MG tablet Take 5 mg by mouth daily.     ? miconazole (REMEDY ANTIFUNGAL) 2 % cream Apply topically 2 (two) times a day.     ? omeprazole (PRILOSEC) 20 MG capsule Take 20 mg by mouth daily.     ? piroxicam (FELDENE) 10 MG capsule Take 10 mg by mouth daily.     ? potassium chloride SA (K-DUR,KLOR-CON) 20 MEQ tablet Take 20 mEq by mouth 3 (three) times a day.     ? simvastatin (ZOCOR) 20 MG tablet Take 20 mg by mouth bedtime.     ? warfarin (COUMADIN) 2.5 MG tablet Take 2.5 mg by mouth daily. Adjust dose based on INR results as directed.       Current  Facility-Administered Medications   Medication Dose Route Frequency Provider Last Rate Last Dose   ? lidocaine 2 % jelly (XYLOCAINE)   Topical PRN Ayesha Washington NP   1 application at 08/16/17 0833       Allergies   Allergen Reactions   ? Hydromorphone Anaphylaxis and Unknown   ? Clindamycin Diarrhea   ? Cephalexin Rash       Social History     Social History   ? Marital status:      Spouse name: N/A   ? Number of children: N/A   ? Years of education: N/A     Occupational History   ? Not on file.     Social History Main Topics   ? Smoking status: Former Smoker     Packs/day: 0.25     Years: 10.00     Quit date: 6/27/1958   ? Smokeless tobacco: Never Used   ? Alcohol use No   ? Drug use: No   ? Sexual activity: Yes     Partners: Female     Other Topics Concern   ? Not on file     Social History Narrative       Family History   Problem Relation Age of Onset   ? Early death Sister        Review of Systems:  Theodore Gomes no new numbess, tingling or weakness, redness or rashes, fevers, new masses, abdominal bloating or discomfort, unexplained weight loss, new ulcers, shortness of breath and chest pain  Full 12 point review of systems was completed.      Lab Results   Component Value Date/Time    PREALBUMIN 12.1 (L) 03/18/2014 05:23 AM       Status:  Final result   Visible to patient:  No (Not Released) Next appt:  11/28/2017 at 12:00 AM in Cardiology (Remote Device Check From Home) Dx:  Chronic venous hypertension (idiopath...   Culture   2+ Staphylococcus aureus (!)      2+ Enterococcus faecalis (!)      1+ Enterococcus faecium (!)      1+ Coagulase negative Staphylococcus (!)      Stain   No polymorphonuclear leukocytes seen      1+ Gram negative bacilli      Resulting Agency: [facility]   Susceptibility    Staphylococcus aureus     ADILIA     Cefazolin <=2  Sensitive     Clindamycin See Comment  Resistant     Doxycycline <=0.5  Sensitive     Erythromycin >4  Resistant     Levofloxacin >4  Resistant     Oxacillin 0.5   Sensitive     Trimethoprim + Sulfamethoxazole <=1/19  Sensitive     Vancomycin 1  Sensitive           Susceptibility    Enterococcus faecalis     ADILIA     Ampicillin 1  Sensitive           Susceptibility    Enterococcus faecium     ADILIA     Ampicillin 1  Sensitive           Susceptibility Comments   Staphylococcus aureus   This isolate is presumed to be resistant based on detection of inducible clindamycin resistance.              Physical Exam:  Vitals:    08/30/17 1445   BP: 122/57   Pulse: 62   Resp: 18   Temp: 98.6  F (37  C)     BMI 26.89 (decreased)    Circumferential measures:    Vasc Edema 8/16/2017 8/18/2017 8/24/2017 8/28/2017 8/30/2017   Right just above MTP 23 23 23 23 23   Right Ankle 22.5 22.5 23 23 23   Right Widest Calf 34 34 34.5 34.5 34.5   Right Thigh Up 10cm - - - - -   Left - just above MTP 23.5 23.5 23 23 23   Left Ankle 26 26 23.5 23.5 23.5   Left Widest Calf 37.3 37.3 35 35 35   Left Thigh Up 10cm - - - - -     Measures stable.    General:  89 y.o. male in no apparent distress.  Alert and oriented x 3.  Cooperative. Affect normal.    Vascular: Dorsalis pedis and posterior tibialis pulses are strong and equal bilaterally. There are no significant telangietasias, medial ankle venous flares, venous varicosities  and spider veins . There is normal capillary refill.     Integumentary: Skin of the legs is uniformly warm and dry and and hyperpigmentated.  There are ulcerations along the left medial calf and along the left lateral dorsum of the left foot with slough.  These are stable.  There is no rubor and no calor.  There is no odor.  There is minimal pain to palpation.  Nails are thickened with decreased hair growth on toes, rubor.     Please see flowsheet for measures and photos below.                 6/16/17   L med calf               L foot dorsum               8/30/17 L med calf                 L foot dorsum      Ellie Delcid MD, ABWMS, FACCWS, [facility]  Medical Director Wound Care and  Lymphedema  [facility]  [phone]

## 2021-06-25 NOTE — PROGRESS NOTES
Progress Notes by Joceline De La Torre LPN at 11/24/2017  8:50 AM     Author: Joceline De La Torre LPN Service: -- Author Type: Licensed Nurse    Filed: 11/24/2017  9:05 AM Encounter Date: 11/24/2017 Status: Signed    : Joceline De La Torre LPN (Licensed Nurse)         LEFT THIGH    BILATERAL LEGS      Nurse Visit    Date of Service:11/24/2017    Chief Complaint: Patient presents to clinic for evaluation of their ulcer and and swelling    Dressing on Arrival: 2 layer lite, silvercel, viscopaste, foam to bilateral thigh area    Patient came in today and does have a newer left thigh wound. Noticed at last nurse visit and documented that Dr. Delcid would address at next visit 11/29/17. Patient's thighs are more swollen than normal, documented leg measure in flow chart. Refused buttocks area to be looked at but did state that it is feeling better.     Allergies: Hydromorphone; Clindamycin; and Cephalexin    Assessment:    /60 (Patient Site: Left Arm, Patient Position: Sitting)  Pulse 70  Temp 98.2  F (36.8  C) (Oral)   Resp 16  SpO2 96%    General:  Patient presents to clinic in no apparent distress.  Psychiatric:  Alert and oriented x3.   Lower extremity:  edema is present.    Integumentary:  Skin is uniformly warm, dry and pink.    Wound size:   Wound 07/20/17 left anterior foot (Active)   Pre Size Length 0.2 11/24/2017  8:00 AM   Pre Size Width 0.2 11/24/2017  8:00 AM   Pre Size Depth 0.1 9/21/2017  3:00 PM   Pre Total Sq cm 0.04 11/24/2017  8:00 AM   Post Size Length 0 9/28/2017 12:00 PM   Post Size Width 0 9/28/2017 12:00 PM   Post Size Depth 0 9/28/2017 12:00 PM   Post Total Sq cm 0.35 9/14/2017 12:00 PM   Undermined n 9/14/2017 12:00 PM   Tunneling n 9/14/2017 12:00 PM   Description red wound bed 8/30/2017  2:00 PM   Prodcut Used Foam 9/21/2017  3:00 PM       Wound 11/03/17 left medial leg superior (Active)   Pre Size Length 0 11/24/2017  8:00 AM   Pre Size Width 0 11/24/2017  8:00 AM   Pre Size Depth 0  11/24/2017  8:00 AM   Pre Total Sq cm 0 11/24/2017  8:00 AM       Wound 11/03/17 left medial leg inferior (Active)   Pre Size Length 0 11/24/2017  8:00 AM   Pre Size Width 0 11/24/2017  8:00 AM   Pre Size Depth 0 11/24/2017  8:00 AM   Pre Total Sq cm 0 11/24/2017  8:00 AM       Wound 11/07/17 right buttock (Active)   Pre Size Length 0.5 11/7/2017  2:00 PM   Pre Size Width 0.6 11/7/2017  2:00 PM   Pre Total Sq cm 0.3 11/7/2017  2:00 PM       Wound 11/16/17 right medial ankle (Active)   Pre Size Length 4.5 11/24/2017  8:00 AM   Pre Size Width 3 11/24/2017  8:00 AM   Pre Total Sq cm 13.5 11/24/2017  8:00 AM   Description weepy 11/24/2017  8:00 AM       Wound 06/29/14 Other (Comment) Arm Left;Right rash (Active)       Wound 06/29/14 Erythema Sacrum Other (Comment) red-blanchable (Active)       Incision 08/22/17 Chest Left (Active)     Vasc Edema 10/30/2017 11/7/2017 11/16/2017 11/20/2017 11/24/2017   Right just above MTP 25 - 24 24.4 24.3   Right Ankle 26 - 26 24.4 25   Right Widest Calf 35.8 - 42 41 36.6   Right Thigh Up 10cm - - - - 55.5   Left - just above MTP 25 24 24 23.5 23.5   Left Ankle 24.5 23.5 23 23.8 25.5   Left Widest Calf 39.2 35.7 35 37.6 36   Left Thigh Up 10cm - - - - 53         Undermining is not present.    The periwoundskin is pink and macerated    Plan:         1. Patient will Follow up 11/29/17 with Dr. Delcid        2. Treatment provided: dressing change     Cleansed with:Remedy skin cleanser  Protected skin with: Remedy lotion  Applied: Silver alginate  Packed/filled with: NA  Covered with: ABD pads  Secured with: 2 layer lite

## 2021-06-25 NOTE — PROGRESS NOTES
Progress Notes by Ellie Delcid MD at 9/7/2017  2:00 PM     Author: Ellie Delcid MD Service: -- Author Type: Physician    Filed: 9/7/2017  5:11 PM Encounter Date: 9/7/2017 Status: Signed    : Ellie Delcid MD (Physician)       Date of Service: 09/07/17    Date last seen:  08/30/17    PCP: Samantha Ledezma MD    IMPRESSION:   1. Lower extremity swelling due to venous stasis and secondary lymphedema with dependent edema under excellent control.   2. Left leg and foot venous ulcerations-improving nicely (77% decrease in size since last visit)-epifix #2 placed  3. Stasis dermatitis  4. Complicated by underlying CHF  5. On chronic anticoagulation     PLAN:   1. After risks were reviewed including infection, allergic reaction and scarring consent was received.  The left, calf, foot and venous stasis/insufficiency with venous hypertension ulceration(s)was/ were debrided with clean technique.  curette and was used to remove slough and fibrin deposition along with devitalized tissue.  This was to improve granulation tissue formation, stimulate wound healing, decrease biofilm formation, decrease bacterial load, decrease senescent edges and prepare for bioengineered graft placement.  A total of 1 square centimeters was debrided from the epidermis/dermis.  A 18mm piece of Epifix was prepared according to 's directions.  It was cut and fit on the ulceration.  It was secured with  veil conformant and Steri-Strips. No square centimeters were wasted.  After the primary dressing was placed the secondary dressing of alginate then foam followed by two layer lite was used.  2.  Continue velcro compression on right.  3.  Follow up in one week.      ---------------------------------------------------------------------------------------------------------------------     Chief Complaint: Bilateral leg swelling and venous ulcerations    History of Present Illness:   Theodore Gomes and his wife  return to the [facility] for follow up of bilateral leg swelling with ulcerations due to venous stasis and hypertension and secondary lymphedema.  He was doing very well and using his compression regularly then developed with weeping and ulcers in the front of the left leg. He was debrided and came in for regular dressing of alginate with silver, ABD and double layer tubigrip.   I had ordered 2 layer but he was switched to tubigrip 7/31/17 as he complained of discomfort.   His culture also came back positive for staph aureus and he was started on doxycycline.  He finished it on 8/3/17.  The left leg was slowly doing better.  Debridement was done with wound care of sorbact then alginate then abd then 2 layer light. .  Culture showed staph and e. Faecalis.  His doxycycline was restarted.   The pain became less and the wounds seem smaller.  He was cleared for epi-fix.  His first epi-fix was placed 1 week ago.  He comes in for follow-up.  He has noticed decrease pain.  There has been no increased drainage. There has been no new numbness, tingling, weakness, masses, rashes, shortness of breath or chest pain. There has been no new fevers. There are no new or changing skin lesions.       Past Medical History:   Diagnosis Date   ? Acquired lymphedema     left leg   ? Anemia    ? Arrhythmia     vtach   ? Atrial fibrillation    ? BPH (benign prostatic hyperplasia)    ? Cardiac defibrillator in place    ? Cellulitis 8/16-present    L foot , being treated by Dr Delcid at [facility] clinic   ? CHF (congestive heart failure)    ? Chronic kidney disease     ckd 3   ? Chronic systolic heart failure    ? Clostridium difficile colitis 6/29/2014   ? CVA, old, homonymous hemianopsia    ? Ejection fraction < 50% at 25%   ? Esophagitis    ? Gout    ? Hyperlipidemia    ? Hypertension    ? Lymphedema of both lower extremities    ? Nonischemic dilated cardiomyopathy    ? Peripheral edema     chronic with venous  insufficiency   ? Stroke Syndrome        Past Surgical History:   Procedure Laterality Date   ? INSERT / REPLACE / REMOVE PACEMAKER  05/22/2012    medtronic ICD   ? INSERT / REPLACE / REMOVE PACEMAKER  08/22/2017    icd gen change   ? [place] INSJ/RPLCMT PERM DFB W/TRNSVNS LDS 1/DUAL CHMBR      Description: Cardioverter-defibrillator Pulse Generator Insertion With LV Leads;  Recorded: 06/15/2012;  Comments: 5/22/12 upgraded from ICD to BiVICD       Current Outpatient Prescriptions   Medication Sig Dispense Refill   ? acetaminophen (TYLENOL) 500 MG tablet Take 1,000 mg by mouth every 6 (six) hours as needed for pain.     ? allopurinol (ZYLOPRIM) 100 MG tablet Take 100 mg by mouth 2 (two) times a day.      ? bumetanide (BUMEX) 2 MG tablet Take 2 mg by mouth 2 (two) times a day.   0   ? carvedilol (COREG) 25 MG tablet Take 25 mg by mouth 2 (two) times a day with meals.     ? diclofenac sodium (VOLTAREN) 1 % Gel Apply topically 4 (four) times a day.     ? fluocinonide (LIDEX) 0.05 % cream APPLY TO ITCHING RED AREAS OF SKIN TWICE DAILY AS NEEDED. DO NOT APPLY TO OPENED AREAS OF SKIN 30 g 2   ? fluticasone (FLONASE) 50 mcg/actuation nasal spray 1 spray into each nostril daily.     ? L ACIDOPHIL/B LACTIS/B LONGUM (FLORAJEN3 ORAL) Take 1 tablet by mouth daily.     ? lisinopril (PRINIVIL,ZESTRIL) 10 MG tablet Take 5 mg by mouth daily.      ? lisinopril (PRINIVIL,ZESTRIL) 5 MG tablet Take 5 mg by mouth daily.     ? miconazole (REMEDY ANTIFUNGAL) 2 % cream Apply topically 2 (two) times a day.     ? omeprazole (PRILOSEC) 20 MG capsule Take 20 mg by mouth daily.     ? piroxicam (FELDENE) 10 MG capsule Take 10 mg by mouth daily.     ? potassium chloride SA (K-DUR,KLOR-CON) 20 MEQ tablet Take 20 mEq by mouth 3 (three) times a day.     ? simvastatin (ZOCOR) 20 MG tablet Take 20 mg by mouth bedtime.     ? warfarin (COUMADIN) 2.5 MG tablet Take 2.5 mg by mouth daily. Adjust dose based on INR results as directed.       Current  Facility-Administered Medications   Medication Dose Route Frequency Provider Last Rate Last Dose   ? lidocaine 2 % jelly (XYLOCAINE)   Topical PRN Ayesha Washington NP   1 application at 09/07/17 1446       Allergies   Allergen Reactions   ? Hydromorphone Anaphylaxis and Unknown   ? Clindamycin Diarrhea   ? Cephalexin Rash       Social History     Social History   ? Marital status:      Spouse name: N/A   ? Number of children: N/A   ? Years of education: N/A     Occupational History   ? Not on file.     Social History Main Topics   ? Smoking status: Former Smoker     Packs/day: 0.25     Years: 10.00     Quit date: 6/27/1958   ? Smokeless tobacco: Never Used   ? Alcohol use No   ? Drug use: No   ? Sexual activity: Yes     Partners: Female     Other Topics Concern   ? Not on file     Social History Narrative       Family History   Problem Relation Age of Onset   ? Early death Sister        Review of Systems:  Theodore Gomes no new numbess, tingling or weakness, redness or rashes, fevers, new masses, abdominal bloating or discomfort, unexplained weight loss, new ulcers, shortness of breath and chest pain  Full 12 point review of systems was completed.      Lab Results   Component Value Date/Time    PREALBUMIN 12.1 (L) 03/18/2014 05:23 AM       Status:  Final result   Visible to patient:  No (Not Released) Next appt:  11/28/2017 at 12:00 AM in Cardiology (Remote Device Check From Home) Dx:  Chronic venous hypertension (idiopath...   Culture   2+ Staphylococcus aureus (!)      2+ Enterococcus faecalis (!)      1+ Enterococcus faecium (!)      1+ Coagulase negative Staphylococcus (!)      Stain   No polymorphonuclear leukocytes seen      1+ Gram negative bacilli      Resulting Agency: [facility]   Susceptibility    Staphylococcus aureus     ADILIA     Cefazolin <=2  Sensitive     Clindamycin See Comment  Resistant     Doxycycline <=0.5  Sensitive     Erythromycin >4  Resistant     Levofloxacin >4  Resistant     Oxacillin 0.5   Sensitive     Trimethoprim + Sulfamethoxazole <=1/19  Sensitive     Vancomycin 1  Sensitive           Susceptibility    Enterococcus faecalis     ADILIA     Ampicillin 1  Sensitive           Susceptibility    Enterococcus faecium     ADILIA     Ampicillin 1  Sensitive           Susceptibility Comments   Staphylococcus aureus   This isolate is presumed to be resistant based on detection of inducible clindamycin resistance.              Physical Exam:  Vitals:    09/07/17 1427   BP: 112/60   Pulse: 72   Resp: 16   Temp: 97.6  F (36.4  C)     BMI 26.89 (decreased)    Circumferential measures:    Vasc Edema 8/18/2017 8/24/2017 8/28/2017 8/30/2017 9/7/2017   Right just above MTP 23 23 23 23 24.3   Right Ankle 22.5 23 23 23 23.8   Right Widest Calf 34 34.5 34.5 34.5 33.8   Right Thigh Up 10cm - - - - -   Left - just above MTP 23.5 23 23 23 24.8   Left Ankle 26 23.5 23.5 23.5 23.7   Left Widest Calf 37.3 35 35 35 34.8   Left Thigh Up 10cm - - - - -     Measures stable and under good control.    General:  89 y.o. male in no apparent distress.  Alert and oriented x 3.  Cooperative. Affect normal.    Vascular: Dorsalis pedis and posterior tibialis pulses are strong and equal bilaterally. There are no significant telangietasias, medial ankle venous flares, venous varicosities  and spider veins . There is normal capillary refill.     Integumentary: Skin of the legs is uniformly warm and dry and and hyperpigmentated.  The ulcerations along the left medial calf and the left lateral dorsum of the left foot have some slough.  When this was removed the ulcerations have improved greatly.  Left medial calf measures 0.7 x 0.3 cm.  The left lateral dorsum of the left foot measures 0.8 x 0.5 cm.  Both have a depth of 0.1 cm.  They are much smaller.  There is no odor.  There is no discharge.  It was noted that there was significantly decreased pain with debridement today.  These are stable.  There is no rubor and no calor.   Nails are thickened  with decreased hair growth on toes, rubor.     Please see flowsheet for measures and photos below.                 6/16/17   L med calf               L foot dorsum               8/30/17 L med calf                 L foot dorsum    Ellie Delcid MD, ABWMS, FACCWS, [facility]  Medical Director Wound Care and Lymphedema  [facility]  [phone]

## 2021-06-25 NOTE — PROGRESS NOTES
Progress Notes by Romi Rodriguez RN at 8/28/2017 10:00 AM     Author: Romi Rodriguez RN Service: -- Author Type: Registered Nurse    Filed: 8/28/2017 11:29 AM Encounter Date: 8/28/2017 Status: Signed    : Romi Rodriguez RN (Registered Nurse)           Left foot 8/28        Left leg 8/28    Nurse Visit      Date of Service:8/28/2017    Chief Complaint: Patient presents to clinic for evaluation of their lower extremity swelling and ulcers    Dressing on Arrival: 2-layer lite      Allergies: Hydromorphone; Clindamycin; and Cephalexin    Assessment:    /50  Pulse 64  Temp 98.6  F (37  C) (Oral)     General:  Patient presents to clinic in no apparent distress.  Psychiatric:  Alert and oriented x3.   Lower extremity:  edema is present.    Integumentary:  Skin is uniformly warm, dry and pink.    Wound size:   Wound 01/28/16 Toe Right;2nd digit (Active)   Pre Size Length 0.5 8/16/2017  8:00 AM   Pre Size Width 0.5 8/16/2017  8:00 AM   Pre Total Sq cm 0.25 8/16/2017  8:00 AM   Post Size Length 1.5 1/28/2016  3:00 PM   Post Size Width 1 1/28/2016  3:00 PM   Post Total Sq cm 1.5 1/28/2016  3:00 PM   Description scab 8/16/2017  8:00 AM   Prodcut Used ABD Pad;Alginate 7/27/2017  2:00 PM       Wound 07/20/17 left anterior foot (Active)   Pre Size Length 1.2 8/28/2017 11:00 AM   Pre Size Width 1 8/28/2017 11:00 AM   Pre Size Depth 0.1 8/18/2017 11:00 AM   Pre Total Sq cm 1.2 8/28/2017 11:00 AM   Post Size Length 1 8/9/2017  3:00 PM   Post Size Width 0.5 8/9/2017  3:00 PM   Undermined N 8/18/2017 11:00 AM   Tunneling N 8/18/2017 11:00 AM   Description RED WOUND BD 8/18/2017 11:00 AM   Prodcut Used ABD Pad;Alginate 8/24/2017 11:00 AM       Wound 07/20/17 left medial ankle (Active)   Pre Size Length 2.5 8/24/2017 11:00 AM   Pre Size Width 1.5 8/24/2017 11:00 AM   Pre Size Depth 0.2 8/18/2017 11:00 AM   Pre Total Sq cm 3.75 8/24/2017 11:00 AM   Prodcut Used ABD Pad;Alginate 8/24/2017 11:00 AM       Wound 07/31/17 left  posterior leg (Active)   Pre Size Length 0.8 8/28/2017 11:00 AM   Pre Size Width 0.8 8/28/2017 11:00 AM   Pre Total Sq cm 0.64 8/28/2017 11:00 AM   Undermined N 8/18/2017 11:00 AM   Tunneling N 8/18/2017 11:00 AM   Description RED WOUND BED 8/18/2017 11:00 AM   Prodcut Used ABD Pad;Alginate 8/28/2017 11:00 AM       Wound 06/29/14 Other (Comment) Arm Left;Right rash (Active)       Wound 06/29/14 Erythema Sacrum Other (Comment) red-blanchable (Active)       Incision 08/22/17 Chest Left (Active)      Undermining is not present  The periwoundskin is WNL     Plan:         1. Patient will Follow up Wednesday with Dr. Delcid         2. Treatment provided:     Cleansed with: normal saline and Clinical Care/Carrklenz  Protected skin with: remedy lotion to intact skin  Applied: ADB pads and calcium alginate  Packed/filled with: sorbact  Covered with: 2-layer  Bandage lite to left  Secured with: tape

## 2021-06-25 NOTE — PROGRESS NOTES
Progress Notes by Willa Sarmiento LPN at 11/7/2017  2:00 PM     Author: Willa Sarmiento LPN Service: -- Author Type: Licensed Nurse    Filed: 11/7/2017  2:39 PM Encounter Date: 11/7/2017 Status: Signed    : Willa Sarmiento LPN (Licensed Nurse)               Routed message to Dr. Delcid and Jacquie Sandoval regarding right buttock ulcer. Patient stated it is not a new ulcer and has been having it treated by Dr. Ledezma with Bacitracin and a bandaid. He saw her yesterday in clinic and was told that [facility] should take a look at it now. Patient is seeing Jacquie Sandoval next week.     Nurse Visit    Date of Service:11/7/2017    Chief Complaint: Patient presents to clinic for evaluation of their ulcer and and swelling    Dressing on Arrival: 2 layer and silvercel      Allergies: Hydromorphone; Clindamycin; and Cephalexin    Assessment:    Pulse 64  Temp 97.7  F (36.5  C) (Oral)   Resp 16    General:  Patient presents to clinic in no apparent distress.  Psychiatric:  Alert and oriented x3.   Lower extremity:  edema is present.    Integumentary:  Skin is uniformly warm, dry and pink.    Wound size:   Wound 07/20/17 left anterior foot (Active)   Pre Size Length 0.5 11/7/2017  2:00 PM   Pre Size Width 0.3 11/7/2017  2:00 PM   Pre Size Depth 0.1 9/21/2017  3:00 PM   Pre Total Sq cm 0.15 11/7/2017  2:00 PM   Post Size Length 0 9/28/2017 12:00 PM   Post Size Width 0 9/28/2017 12:00 PM   Post Size Depth 0 9/28/2017 12:00 PM   Post Total Sq cm 0.35 9/14/2017 12:00 PM   Undermined n 9/14/2017 12:00 PM   Tunneling n 9/14/2017 12:00 PM   Description red wound bed 8/30/2017  2:00 PM   Prodcut Used Foam 9/21/2017  3:00 PM       Wound 11/03/17 left medial leg superior (Active)   Pre Size Length 0.5 11/7/2017  2:00 PM   Pre Size Width 0.5 11/7/2017  2:00 PM   Pre Total Sq cm 0.25 11/7/2017  2:00 PM       Wound 11/03/17 left medial leg inferior (Active)   Pre Size Length 0.5 11/7/2017  2:00 PM    Pre Size Width 0.5 11/7/2017  2:00 PM   Pre Total Sq cm 0.25 11/7/2017  2:00 PM       Wound 11/07/17 right buttock (Active)   Pre Size Length 0.5 11/7/2017  2:00 PM   Pre Size Width 0.6 11/7/2017  2:00 PM   Pre Total Sq cm 0.3 11/7/2017  2:00 PM       Wound 06/29/14 Other (Comment) Arm Left;Right rash (Active)       Wound 06/29/14 Erythema Sacrum Other (Comment) red-blanchable (Active)       Incision 08/22/17 Chest Left (Active)      Vasc Edema 9/7/2017 9/14/2017 9/21/2017 10/30/2017 11/7/2017   Right just above MTP 24.3 - - 25 -   Right Ankle 23.8 - - 26 -   Right Widest Calf 33.8 - - 35.8 -   Right Thigh Up 10cm - - - - -   Left - just above MTP 24.8 24.8 23 25 24   Left Ankle 23.7 23.7 24 24.5 23.5   Left Widest Calf 34.8 34.8 32 39.2 35.7   Left Thigh Up 10cm - - - - -       Undermining is not present.    The periwoundskin is pink      Plan:         1. Patient will Follow up 11/16/17         2. Treatment provided: dressing change orders     Cleansed with: Remedy skin cleanser  Protected skin with: Remedy lotion  Applied: Silvercel  Packed/filled with: NA  Covered with: ABD pads  Secured with: 2 layer to left leg

## 2021-06-25 NOTE — PROGRESS NOTES
Progress Notes by Sixto Cartagena RN at 8/24/2017 11:00 AM     Author: Sixto Cartagena RN Service: -- Author Type: Registered Nurse    Filed: 8/24/2017 11:47 AM Encounter Date: 8/24/2017 Status: Addendum    : Sixto Cartagena RN (Registered Nurse)    Related Notes: Original Note by Sixto Cartagena RN (Registered Nurse) filed at 8/24/2017 11:45 AM               8/24/17 left leg wounds and bilateral swelling continues. Tolerating 2 layer lite on left leg well. Arrives with velcro backwards and no compression on foot on right leg. Refused tubigrip.  New defibrillator just placed without problems.  Nurse Visit      Date of Service:8/24/2017    Chief Complaint: Patient presents to clinic for evaluation of their ulcer and and swelling    Dressing on Arrival left 2 layer lite/sorbact/alginate/abd      Allergies: Hydromorphone; Clindamycin; and Cephalexin    Assessment:    /60  Pulse 68  Temp 97.7  F (36.5  C) (Oral)   Resp 14    General:  Patient presents to clinic in no apparent distress.  Psychiatric:  Alert and oriented x3.   Lower extremity:  edema is present.    Integumentary:  Skin is uniformly warm, dry and pink.    Wound size:   Wound 01/28/16 Toe Right;2nd digit (Active)   Pre Size Length 0.5 8/16/2017  8:00 AM   Pre Size Width 0.5 8/16/2017  8:00 AM   Pre Total Sq cm 0.25 8/16/2017  8:00 AM   Post Size Length 1.5 1/28/2016  3:00 PM   Post Size Width 1 1/28/2016  3:00 PM   Post Total Sq cm 1.5 1/28/2016  3:00 PM   Description scab 8/16/2017  8:00 AM   Prodcut Used ABD Pad;Alginate 7/27/2017  2:00 PM       Wound 07/20/17 left anterior foot (Active)   Pre Size Length 1.2 8/24/2017 11:00 AM   Pre Size Width 1 8/24/2017 11:00 AM   Pre Size Depth 0.1 8/18/2017 11:00 AM   Pre Total Sq cm 1.2 8/24/2017 11:00 AM   Post Size Length 1 8/9/2017  3:00 PM   Post Size Width 0.5 8/9/2017  3:00 PM   Undermined N 8/18/2017 11:00 AM   Tunneling N 8/18/2017 11:00 AM   Description RED WOUND BD 8/18/2017 11:00 AM    Prodcut Used ABD Pad;Alginate 8/24/2017 11:00 AM       Wound 07/20/17 left medial ankle (Active)   Pre Size Length 2.5 8/24/2017 11:00 AM   Pre Size Width 1.5 8/24/2017 11:00 AM   Pre Size Depth 0.2 8/18/2017 11:00 AM   Pre Total Sq cm 3.75 8/24/2017 11:00 AM   Prodcut Used ABD Pad;Alginate 8/24/2017 11:00 AM       Wound 07/31/17 left posterior leg (Active)   Pre Size Length 0.3 8/24/2017 11:00 AM   Pre Size Width 0.3 8/24/2017 11:00 AM   Pre Total Sq cm 0.9 8/24/2017 11:00 AM   Undermined N 8/18/2017 11:00 AM   Tunneling N 8/18/2017 11:00 AM   Description RED WOUND BED 8/18/2017 11:00 AM   Prodcut Used ABD Pad;Alginate 8/24/2017 11:00 AM       Wound 06/29/14 Other (Comment) Arm Left;Right rash (Active)       Wound 06/29/14 Erythema Sacrum Other (Comment) red-blanchable (Active)       Incision 08/22/17 Chest Left (Active)      Undermining is not present.    The periwoundskin is WNL  /hemosciderin continues    Plan:         1. Patient will Follow up Monday Nurse visit         2. Treatment provided:     Cleansed with: normal saline and Clinical Care/Carrklenz  Protected skin with: remedy lotion tointact skin  Applied: ADB pads and calcium alginate  Packed/filled with: sorbact  Covered with: 2-layer  Bandage lite to left  Secured with: tape

## 2021-06-25 NOTE — PROGRESS NOTES
Progress Notes by Willa Sarmiento LPN at 7/31/2017  1:20 PM     Author: Willa Sarmiento LPN Service: -- Author Type: Licensed Nurse    Filed: 7/31/2017  2:12 PM Encounter Date: 7/31/2017 Status: Signed    : Willa Sarmiento LPN (Licensed Nurse)         Right 2nd toe    Left medial ankle    Left anterior foot    Bilateral legs    Left posterior leg    Spoke with Jacquie Sandoval CNP regarding Chato's increase in swelling in left leg. Patient arrived with no compression on left leg and left lateral leg is weeping. She okayed to apply double tubigrip on the left leg. Educated to remove if shortness of breath.     Nurse Visit    Date of Service:7/31/2017    Chief Complaint: Patient presents to clinic for evaluation of their ulcer and and swelling    Dressing on Arrival: ABD pads    Allergies: Hydromorphone; Clindamycin; and Cephalexin    Assessment:    Pulse 64  Temp 97.5  F (36.4  C) (Oral)   Resp 16    General:  Patient presents to clinic in no apparent distress.  Psychiatric:  Alert and oriented x3.   Lower extremity:  edema is present.    Integumentary:  Skin is uniformly warm, dry and pink.    Wound size:   Wound 01/28/16 Toe Right;2nd digit (Active)   Pre Size Length 0.3 7/31/2017  1:00 PM   Pre Size Width 0.3 7/31/2017  1:00 PM   Pre Total Sq cm 0.09 7/31/2017  1:00 PM   Post Size Length 1.5 1/28/2016  3:00 PM   Post Size Width 1 1/28/2016  3:00 PM   Post Total Sq cm 1.5 1/28/2016  3:00 PM   Description blood blister 7/31/2017  1:00 PM   Prodcut Used ABD Pad;Alginate 7/27/2017  2:00 PM       Wound 07/20/17 left anterior foot (Active)   Pre Size Length 1.5 7/31/2017  1:00 PM   Pre Size Width 1 7/31/2017  1:00 PM   Pre Total Sq cm 1.5 7/31/2017  1:00 PM   Description scattered small ulcers 7/20/2017  2:00 PM   Prodcut Used ABD Pad;Alginate 7/28/2017  9:00 AM       Wound 07/20/17 left medial ankle (Active)   Pre Size Length 0.7 7/31/2017  1:00 PM   Pre Size Width 0.9 7/31/2017  1:00 PM   Pre Total Sq  cm 0.63 7/31/2017  1:00 PM   Prodcut Used ABD Pad;Alginate 7/28/2017  9:00 AM       Wound 07/20/17 left 2nd digit (Active)   Pre Size Length 0 7/31/2017  1:00 PM   Pre Size Width 0 7/31/2017  1:00 PM   Pre Size Depth 0 7/31/2017  1:00 PM   Pre Total Sq cm 0 7/31/2017  1:00 PM   Prodcut Used ABD Pad;Alginate 7/27/2017  2:00 PM       Wound 07/31/17 left posterior leg (Active)   Pre Size Length 1.6 7/31/2017  1:00 PM   Pre Size Width 1 7/31/2017  1:00 PM   Pre Total Sq cm 1.6 7/31/2017  1:00 PM       Wound 06/29/14 Other (Comment) Arm Left;Right rash (Active)       Wound 06/29/14 Erythema Sacrum Other (Comment) red-blanchable (Active)      Vasc Edema 10/22/2015 1/28/2016 2/25/2016 9/15/2016 7/31/2017   Right just above MTP 24.1 23.6 25.0 23.5 23.4   Right Ankle 23.9 24.6 26.0 24.2 23.4   Right Widest Calf 33.8 33.0 33.2 34.2 35.4   Right Thigh Up 10cm 42.6 40.5 - - -   Left - just above MTP 24.0 23.3 23.0 23.4 24   Left Ankle 24.8 23.9 26.0 25.4 24.5   Left Widest Calf 35.0 34.5 36.0 35.5 40   Left Thigh Up 10cm 40.0 39.2 - - -         Undermining is not present.    The periwoundskin is WNL    Plan:         1. Patient will Follow up 8/3/17         2. Treatment provided: wound care orders     Cleansed with: normal saline  Protected skin with: remedy lotion  Applied: Silvercel  Packed/filled with: NA  Covered with: ABD pads  Secured with: tubigrip doubled on left and tubigrip single on right with velcro

## 2021-06-25 NOTE — PROGRESS NOTES
Progress Notes by Jillian Pete RN at 8/21/2017 10:20 AM     Author: Jillian Pete RN Service: -- Author Type: Registered Nurse    Filed: 8/21/2017 11:54 AM Encounter Date: 8/21/2017 Status: Signed    : Jillian Pete RN (Registered Nurse)       Nurse Visit      Date of Service:8/21/2017    Chief Complaint: Patient presents to clinic for evaluation of their ulcer and and swelling    Dressing on Arrival 2 layer lite      Allergies: Hydromorphone; Clindamycin; and Cephalexin    Assessment:    /68  Pulse 60  Temp 97.7  F (36.5  C) (Oral)   Resp 14    General:  Patient presents to clinic in no apparent distress.  Psychiatric:  Alert and oriented x3.   Lower extremity:  edema is present.    Integumentary:  Skin is uniformly warm, dry and pink.    Wound size:   Wound 01/28/16 Toe Right;2nd digit (Active)   Pre Size Length 0.5 8/16/2017  8:00 AM   Pre Size Width 0.5 8/16/2017  8:00 AM   Pre Total Sq cm 0.25 8/16/2017  8:00 AM   Post Size Length 1.5 1/28/2016  3:00 PM   Post Size Width 1 1/28/2016  3:00 PM   Post Total Sq cm 1.5 1/28/2016  3:00 PM   Description scab 8/16/2017  8:00 AM   Prodcut Used ABD Pad;Alginate 7/27/2017  2:00 PM       Wound 07/20/17 left anterior foot (Active)   Pre Size Length 1.4 8/21/2017 11:00 AM   Pre Size Width 1 8/21/2017 11:00 AM   Pre Size Depth 0.1 8/18/2017 11:00 AM   Pre Total Sq cm 1.4 8/21/2017 11:00 AM   Post Size Length 1 8/9/2017  3:00 PM   Post Size Width 0.5 8/9/2017  3:00 PM   Undermined N 8/18/2017 11:00 AM   Tunneling N 8/18/2017 11:00 AM   Description RED WOUND BD 8/18/2017 11:00 AM   Prodcut Used ABD Pad 8/18/2017 11:00 AM       Wound 07/20/17 left medial ankle (Active)   Pre Size Length 2.5 8/21/2017 11:00 AM   Pre Size Width 1.5 8/21/2017 11:00 AM   Pre Size Depth 0.2 8/18/2017 11:00 AM   Pre Total Sq cm 3.75 8/21/2017 11:00 AM   Prodcut Used ABD Pad;Alginate 7/28/2017  9:00 AM       Wound 07/31/17 left posterior leg (Active)   Pre Size Length 0.4  8/18/2017 11:00 AM   Pre Size Width 0.3 8/18/2017 11:00 AM   Pre Total Sq cm 0.12 8/18/2017 11:00 AM   Undermined N 8/18/2017 11:00 AM   Tunneling N 8/18/2017 11:00 AM   Description RED WOUND BED 8/18/2017 11:00 AM   Prodcut Used ABD Pad 8/18/2017 11:00 AM       Wound 06/29/14 Other (Comment) Arm Left;Right rash (Active)       Wound 06/29/14 Erythema Sacrum Other (Comment) red-blanchable (Active)      Undermining is not present.    The periwoundskin is WNL      Plan:         1. Patient will Follow up 3 days         2. Treatment provided:  Patient is here today for dressing change of his left leg edema with ulcers.  Patient arrives today with left leg dressing on and intact.  Patients dressing was removed, leg cleansed, and new dressings applied per last orders.  Patient tolerated dressing change well and has no signs or symptoms of infection at this time.  Patient will follow up as previously scheduled.     Cleansed with: normal saline  Protected skin with: remedy skin repair cream  Applied: sorbact and calcium alginate  Packed/filled with: none  Covered with: highly absorptive foam  Secured with: [place] 2 layer lite          Left leg

## 2021-06-25 NOTE — PROGRESS NOTES
Progress Notes by Jillian Pete RN at 8/3/2017  2:30 PM     Author: Jillian Pete RN Service: -- Author Type: Registered Nurse    Filed: 8/3/2017  4:46 PM Encounter Date: 8/3/2017 Status: Signed    : Jillian Pete RN (Registered Nurse)       Nurse Visit      Date of Service:8/3/2017    Chief Complaint: Patient presents to clinic for evaluation of their ulcer and and swelling    Dressing on Arrival tubigrip      Allergies: Hydromorphone; Clindamycin; and Cephalexin    Assessment:    /62  Pulse 64  Temp 96.9  F (36.1  C) (Oral)   Resp 16    General:  Patient presents to clinic in no apparent distress.  Psychiatric:  Alert and oriented x3.   Lower extremity:  edema is present.    Integumentary:  Skin is uniformly warm, dry and pink.    Wound size:   Wound 01/28/16 Toe Right;2nd digit (Active)   Pre Size Length 0.3 7/31/2017  1:00 PM   Pre Size Width 0.3 7/31/2017  1:00 PM   Pre Total Sq cm 0.09 7/31/2017  1:00 PM   Post Size Length 1.5 1/28/2016  3:00 PM   Post Size Width 1 1/28/2016  3:00 PM   Post Total Sq cm 1.5 1/28/2016  3:00 PM   Description blood blister 7/31/2017  1:00 PM   Prodcut Used ABD Pad;Alginate 7/27/2017  2:00 PM       Wound 07/20/17 left anterior foot (Active)   Pre Size Length 1.5 7/31/2017  1:00 PM   Pre Size Width 1 7/31/2017  1:00 PM   Pre Total Sq cm 1.5 7/31/2017  1:00 PM   Description scattered small ulcers 7/20/2017  2:00 PM   Prodcut Used ABD Pad;Alginate 7/28/2017  9:00 AM       Wound 07/20/17 left medial ankle (Active)   Pre Size Length 0.5 8/3/2017  2:00 PM   Pre Size Width 0.5 8/3/2017  2:00 PM   Pre Total Sq cm 0.63 7/31/2017  1:00 PM   Prodcut Used ABD Pad;Alginate 7/28/2017  9:00 AM       Wound 07/20/17 left 2nd digit (Active)   Pre Size Length 0 7/31/2017  1:00 PM   Pre Size Width 0 7/31/2017  1:00 PM   Pre Size Depth 0 7/31/2017  1:00 PM   Pre Total Sq cm 0 7/31/2017  1:00 PM   Prodcut Used ABD Pad;Alginate 7/27/2017  2:00 PM       Wound 07/31/17 left  posterior leg (Active)   Pre Size Length 1.5 8/3/2017  2:00 PM   Pre Size Width 0.8 8/3/2017  2:00 PM   Pre Total Sq cm 1.6 7/31/2017  1:00 PM       Wound 06/29/14 Other (Comment) Arm Left;Right rash (Active)       Wound 06/29/14 Erythema Sacrum Other (Comment) red-blanchable (Active)      Undermining is not present.    The periwoundskin is WNL      Plan:         1. Patient will Follow up in 4 days         2. Treatment provided:  Patient arrives today with dressings on and intact.  Patients dressings were removed, legs cleansed, leg moisturized, and new dressings applied.  Patient tolerated dressing change well and will follow up as previously scheduled.  Patient has not signs or symptoms of infection at this time.     Cleansed with: normal saline  Protected skin with: remedy skin repair cream  Applied: calcium alginate Ag  Packed/filled with: none  Covered with: ABD pads   Secured with: tubigrip-double          Left leg

## 2021-06-25 NOTE — PROGRESS NOTES
Progress Notes by Ellie Delcid MD at 9/28/2017  1:00 PM     Author: Ellie Delcid MD Service: -- Author Type: Physician    Filed: 9/28/2017  4:52 PM Encounter Date: 9/28/2017 Status: Addendum    : Ellie Delcid MD (Physician)    Related Notes: Original Note by Ellie Delcid MD (Physician) filed at 9/28/2017  1:30 PM       Date of Service: 09/28/17    Date last seen:  09/14/17    PCP: Samantha Ledezma MD    IMPRESSION:   1. Lower extremity swelling due to venous stasis and secondary lymphedema with dependent edema under excellent control.   2. Left foot venous ulcerations-closed after two epifix placed  3.  Superficial left lateral foot ulcer new  4. Stasis dermatitis  5. Complicated by underlying CHF  6. On chronic anticoagulation     PLAN:   1. After permission was obtained 2% Lidocaine HCL jelly was applied, under clean conditions, the left, foot and venous stasis/insufficiency with venous hypertension ulceration(s) were debrided using currette.  Devitalized and non viable tissue, along with eschar, any fibrin and slough, was removed to improve granulation tissue formation, stimulate wound healing, decrease overall bacteria load, disrupt biofilm formation and decrease edge senescence.  Total excisional debridement was 2 sq cm from the epidermis/dermis area.   Ulcers were  improved afterwards and .  Measures were as noted on the flow sheet .  2.  Continue velcro compression on right.  3.  Left lateral foot adaptic then foam with silicon border.  Change dressings when moist.   Okay for velcro.  Okay for shoes.  4.  Follow up in three weeks or when needed.     ---------------------------------------------------------------------------------------------------------------------     Chief Complaint: Bilateral leg swelling and venous ulcerations    History of Present Illness:   Theodore Gomes and his wife return to the [facility] for follow up of bilateral  leg swelling with ulcerations due to venous stasis and hypertension and secondary lymphedema.  He was doing very well and using his compression regularly then developed with weeping and ulcers in the front of the left leg. He was debrided and came in for regular dressing of alginate with silver, ABD and double layer tubigrip.   I had ordered 2 layer but he was switched to tubigrip 7/31/17 as he complained of discomfort.   His culture also came back positive for staph aureus and he was started on doxycycline.  He finished it on 8/3/17.  The left leg was slowly doing better.  Debridement was done with wound care of sorbact then alginate then abd then 2 layer light. .  Culture showed staph and e. Faecalis.  His doxycycline was restarted.   The pain became less and the wounds seem smaller.  He was cleared for epi-fix.  His had the second epifix placed at this last visit.  He comes in for follow-up.  He has had no pain.  There has been no increased drainage. There has been no new numbness, tingling, weakness, masses, rashes, shortness of breath or chest pain. There has been no new fevers. There are no new or changing skin lesions.       Past Medical History:   Diagnosis Date   ? Acquired lymphedema     left leg   ? Anemia    ? Arrhythmia     vtach   ? Atrial fibrillation    ? BPH (benign prostatic hyperplasia)    ? Cardiac defibrillator in place    ? Cellulitis 8/16-present    L foot , being treated by Dr Delcid at [facility]   ? CHF (congestive heart failure)    ? Chronic kidney disease     ckd 3   ? Chronic systolic heart failure    ? Clostridium difficile colitis 6/29/2014   ? CVA, old, homonymous hemianopsia    ? Ejection fraction < 50% at 25%   ? Esophagitis    ? Gout    ? Hyperlipidemia    ? Hypertension    ? Lymphedema of both lower extremities    ? Nonischemic dilated cardiomyopathy    ? Peripheral edema     chronic with venous insufficiency   ? Stroke Syndrome        Past Surgical History:   Procedure  Laterality Date   ? INSERT / REPLACE / REMOVE PACEMAKER  05/22/2012    medtronic ICD   ? INSERT / REPLACE / REMOVE PACEMAKER  08/22/2017    icd gen change   ? [place] INSJ/RPLCMT PERM DFB W/TRNSVNS LDS 1/DUAL CHMBR      Description: Cardioverter-defibrillator Pulse Generator Insertion With LV Leads;  Recorded: 06/15/2012;  Comments: 5/22/12 upgraded from ICD to BiVICD       Current Outpatient Prescriptions   Medication Sig Dispense Refill   ? acetaminophen (TYLENOL) 500 MG tablet Take 1,000 mg by mouth every 6 (six) hours as needed for pain.     ? allopurinol (ZYLOPRIM) 100 MG tablet Take 100 mg by mouth 2 (two) times a day.      ? bumetanide (BUMEX) 2 MG tablet Take 2 mg by mouth 2 (two) times a day.   0   ? carvedilol (COREG) 25 MG tablet Take 25 mg by mouth 2 (two) times a day with meals.     ? diclofenac sodium (VOLTAREN) 1 % Gel Apply topically 4 (four) times a day.     ? ferrous sulfate (IRON) 325 (65 FE) MG tablet Take 1 tablet by mouth daily with breakfast.     ? fluocinonide (LIDEX) 0.05 % cream APPLY TO ITCHING RED AREAS OF SKIN TWICE DAILY AS NEEDED. DO NOT APPLY TO OPENED AREAS OF SKIN 30 g 2   ? fluticasone (FLONASE) 50 mcg/actuation nasal spray 1 spray into each nostril daily.     ? L ACIDOPHIL/B LACTIS/B LONGUM (FLORAJEN3 ORAL) Take 1 tablet by mouth daily.     ? lisinopril (PRINIVIL,ZESTRIL) 10 MG tablet Take 5 mg by mouth daily.      ? lisinopril (PRINIVIL,ZESTRIL) 5 MG tablet Take 5 mg by mouth daily.     ? miconazole (REMEDY ANTIFUNGAL) 2 % cream Apply topically 2 (two) times a day.     ? omeprazole (PRILOSEC) 20 MG capsule Take 20 mg by mouth daily.     ? piroxicam (FELDENE) 10 MG capsule Take 10 mg by mouth daily.     ? potassium chloride SA (K-DUR,KLOR-CON) 20 MEQ tablet Take 20 mEq by mouth 3 (three) times a day.     ? simvastatin (ZOCOR) 20 MG tablet Take 20 mg by mouth bedtime.     ? warfarin (COUMADIN) 2.5 MG tablet Take 2.5 mg by mouth daily. Adjust dose based on INR results as directed.        Current Facility-Administered Medications   Medication Dose Route Frequency Provider Last Rate Last Dose   ? lidocaine 2 % jelly (XYLOCAINE)   Topical PRN Ayesha Washington NP   1 application at 09/07/17 1446       Allergies   Allergen Reactions   ? Hydromorphone Anaphylaxis and Unknown   ? Clindamycin Diarrhea   ? Cephalexin Rash       Social History     Social History   ? Marital status:      Spouse name: N/A   ? Number of children: N/A   ? Years of education: N/A     Occupational History   ? Not on file.     Social History Main Topics   ? Smoking status: Former Smoker     Packs/day: 0.25     Years: 10.00     Quit date: 6/27/1958   ? Smokeless tobacco: Never Used   ? Alcohol use No   ? Drug use: No   ? Sexual activity: Yes     Partners: Female     Other Topics Concern   ? Not on file     Social History Narrative       Family History   Problem Relation Age of Onset   ? Early death Sister        Review of Systems:  Theodore Gomes no new numbess, tingling or weakness, redness or rashes, fevers, new masses, abdominal bloating or discomfort, unexplained weight loss, new ulcers, shortness of breath and chest pain  Full 12 point review of systems was completed.      Lab Results   Component Value Date/Time    PREALBUMIN 12.1 (L) 03/18/2014 05:23 AM       Status:  Final result   Visible to patient:  No (Not Released) Next appt:  11/28/2017 at 12:00 AM in Cardiology (Remote Device Check From Home) Dx:  Chronic venous hypertension (idiopath...   Culture   2+ Staphylococcus aureus (!)      2+ Enterococcus faecalis (!)      1+ Enterococcus faecium (!)      1+ Coagulase negative Staphylococcus (!)      Stain   No polymorphonuclear leukocytes seen      1+ Gram negative bacilli      Resulting Agency: [facility]   Susceptibility    Staphylococcus aureus     ADILIA     Cefazolin <=2  Sensitive     Clindamycin See Comment  Resistant     Doxycycline <=0.5  Sensitive     Erythromycin >4  Resistant     Levofloxacin >4  Resistant      Oxacillin 0.5  Sensitive     Trimethoprim + Sulfamethoxazole <=1/19  Sensitive     Vancomycin 1  Sensitive           Susceptibility    Enterococcus faecalis     ADILIA     Ampicillin 1  Sensitive           Susceptibility    Enterococcus faecium     ADILIA     Ampicillin 1  Sensitive           Susceptibility Comments   Staphylococcus aureus   This isolate is presumed to be resistant based on detection of inducible clindamycin resistance.              Physical Exam:  Vitals:    09/28/17 1246   BP: 180/50   Pulse: 64   Resp: 16   Temp: 97.5  F (36.4  C)     BMI 26.89 (decreased)    Circumferential measures:    Vasc Edema 8/28/2017 8/30/2017 9/7/2017 9/14/2017 9/21/2017   Right just above MTP 23 23 24.3 - -   Right Ankle 23 23 23.8 - -   Right Widest Calf 34.5 34.5 33.8 - -   Right Thigh Up 10cm - - - - -   Left - just above MTP 23 23 24.8 24.8 23   Left Ankle 23.5 23.5 23.7 23.7 24   Left Widest Calf 35 35 34.8 34.8 32   Left Thigh Up 10cm - - - - -     Measures stable and under good control.    General:  89 y.o. male in no apparent distress.  Alert and oriented x 3.  Cooperative. Affect normal.    Vascular: Dorsalis pedis and posterior tibialis pulses are strong and equal bilaterally. There are no significant telangietasias, medial ankle venous flares, venous varicosities  and spider veins . There is normal capillary refill.     Integumentary: Skin of the legs is uniformly warm and dry and and hyperpigmentated. Left medial calf measures is essentially closed.  The left lateral mid foot has blistered slightly, but when this was deroofed there is just a superficial ulceration underneath.  The remaining ulcer is 3.5 X 2 cm but again is very superficial and the middle of the ulceration has epithelized.  The left dorsal foot ulcer has significant eschar which when removed shows a closed ulceration.  There is no odor.  There is no discharge.  It was noted that there was no pain with debridement today.   There is no rubor and no  calor.   Nails are thickened with decreased hair growth on toes, rubor.     Please see flowsheet for measures.                 6/16/17   L med calf               L foot dorsum               8/30/17 L med calf                 L foot dorsum    Ellie Delcid MD, ABWMS, FACCWS, [facility]  Medical Director Wound Care and Lymphedema  [facility]  [phone]

## 2021-06-25 NOTE — PROGRESS NOTES
Progress Notes by Ellie Delcid MD at 10/30/2017  1:20 PM     Author: Ellie Delcid MD Service: -- Author Type: Physician    Filed: 10/30/2017  4:28 PM Encounter Date: 10/30/2017 Status: Signed    : Ellie Delcid MD (Physician)       Date of Service: 10/30/17    Date last seen:  09/28/17    PCP: Samantha Ledezma MD    IMPRESSION:   1. Lower extremity swelling due to venous stasis and secondary lymphedema with dependent edema and left leg painl.   2. Left foot/calf venous ulcerations new  3.  Wound infection  4. Stasis dermatitis  5. Complicated by underlying CHF  6. On chronic anticoagulation     PLAN:   1. After permission was obtained 2% Lidocaine HCL jelly was applied, under clean conditions, the left calf and left, foot and venous stasis/insufficiency with venous hypertension ulceration(s) were debrided using currette.  Devitalized and non viable tissue, along with eschar, and significant crusting any fibrin and slough, was removed to improve granulation tissue formation, stimulate wound healing, decrease overall bacteria load, disrupt biofilm formation and decrease edge senescence.  Total excisional debridement was 3 sq cm from the epidermis/dermis area.   Ulcers were  improved afterwards and .  Measures were as noted on the flow sheet .  Significant amount of crusting was removed.  2.  Because of underlying concern of cellulitis and wound infection, Braxton swab culture technique was used to send off anaerobic and aerobic Gram stain and culture from the left ant shin ulcer.  3. I will empirically start him on doxycycline.  Culture pending.  4. Wound treatment will include irrigation and dressings to promote autolytic debridement and will be: alginate with silver then alginate then abd then 2 layer lite.  DO  NOT WRAP TIGHTLY.  Come in twice a week with nursing form dressing changes.    5.  Follow up in 2 weeks with CNP or me,  or when needed.  "    ---------------------------------------------------------------------------------------------------------------------     Chief Complaint: Bilateral leg swelling and venous ulcerations    History of Present Illness:   Theodore Gomes and his wife return to the [facility] [place] for follow up of bilateral leg swelling with ulcerations due to venous stasis and hypertension and secondary lymphedema.  He was doing very well and using his compression regularly then developed with weeping and ulcers in the front of the left leg. He was debrided and came in for regular dressing of alginate with silver, ABD and double layer tubigrip.   I had ordered 2 layer but he was switched to tubigrip 7/31/17 as he complained of discomfort.   His culture also came back positive for staph aureus and he was started on doxycycline.  He finished it on 8/3/17.  The left leg was slowly doing better.  Debridement was done with wound care of sorbact then alginate then abd then 2 layer light. Culture showed staph and e. Faecalis.  His doxycycline was restarted.   The pain became less and the wounds seem smaller.  He was cleared for epi-fix.  His had the second epifix placed and the ulcers healed greatly.  He then was to do velcro compression on right with adaptic then foam with silicon border to the left lag.  He was doing well until his wife had multiple medical compications and was unable to care for him.  His wounds have started to worsened.    There is some \"discomfort\".  There has been increased watery drainage. There has been no new numbness, tingling, weakness, masses, rashes, shortness of breath or chest pain. There has been no new fevers. There are no new or changing skin lesions.       Past Medical History:   Diagnosis Date   ? Acquired lymphedema     left leg   ? Anemia    ? Arrhythmia     vtach   ? Atrial fibrillation    ? BPH (benign prostatic hyperplasia)    ? Cardiac defibrillator in place    ? Cellulitis 8/16-present "    L foot , being treated by Dr Delcid at [facility]   ? CHF (congestive heart failure)    ? Chronic kidney disease     ckd 3   ? Chronic systolic heart failure    ? Clostridium difficile colitis 6/29/2014   ? CVA, old, homonymous hemianopsia    ? Ejection fraction < 50% at 25%   ? Esophagitis    ? Gout    ? Hyperlipidemia    ? Hypertension    ? Lymphedema of both lower extremities    ? Nonischemic dilated cardiomyopathy    ? Peripheral edema     chronic with venous insufficiency   ? Stroke Syndrome        Past Surgical History:   Procedure Laterality Date   ? INSERT / REPLACE / REMOVE PACEMAKER  05/22/2012    medtronic ICD   ? INSERT / REPLACE / REMOVE PACEMAKER  08/22/2017    icd gen change   ? [place] INSJ/RPLCMT PERM DFB W/TRNSVNS LDS 1/DUAL CHMBR      Description: Cardioverter-defibrillator Pulse Generator Insertion With LV Leads;  Recorded: 06/15/2012;  Comments: 5/22/12 upgraded from ICD to BiVICD       Current Outpatient Prescriptions   Medication Sig Dispense Refill   ? acetaminophen (TYLENOL) 500 MG tablet Take 1,000 mg by mouth every 6 (six) hours as needed for pain.     ? allopurinol (ZYLOPRIM) 100 MG tablet Take 100 mg by mouth 2 (two) times a day.      ? bumetanide (BUMEX) 2 MG tablet Take 2 mg by mouth 2 (two) times a day.   0   ? carvedilol (COREG) 25 MG tablet Take 25 mg by mouth 2 (two) times a day with meals.     ? diclofenac sodium (VOLTAREN) 1 % Gel Apply topically 4 (four) times a day.     ? ferrous sulfate (IRON) 325 (65 FE) MG tablet Take 1 tablet by mouth daily with breakfast.     ? fluocinonide (LIDEX) 0.05 % cream APPLY TO ITCHING RED AREAS OF SKIN TWICE DAILY AS NEEDED. DO NOT APPLY TO OPENED AREAS OF SKIN 30 g 2   ? fluticasone (FLONASE) 50 mcg/actuation nasal spray 1 spray into each nostril daily.     ? L ACIDOPHIL/B LACTIS/B LONGUM (FLORAJEN3 ORAL) Take 1 tablet by mouth daily.     ? lisinopril (PRINIVIL,ZESTRIL) 10 MG tablet Take 5 mg by mouth daily.      ? lisinopril  (PRINIVIL,ZESTRIL) 5 MG tablet Take 5 mg by mouth daily.     ? miconazole (REMEDY ANTIFUNGAL) 2 % cream Apply topically 2 (two) times a day.     ? omeprazole (PRILOSEC) 20 MG capsule Take 20 mg by mouth daily.     ? piroxicam (FELDENE) 10 MG capsule Take 10 mg by mouth daily.     ? potassium chloride SA (K-DUR,KLOR-CON) 20 MEQ tablet Take 20 mEq by mouth 3 (three) times a day.     ? simvastatin (ZOCOR) 20 MG tablet Take 20 mg by mouth bedtime.     ? warfarin (COUMADIN) 2.5 MG tablet Take 2.5 mg by mouth daily. Adjust dose based on INR results as directed.     ? doxycycline (MONODOX) 100 MG capsule Take 1 capsule (100 mg total) by mouth 2 (two) times a day for 10 days. 20 capsule 0     Current Facility-Administered Medications   Medication Dose Route Frequency Provider Last Rate Last Dose   ? lidocaine 2 % jelly (XYLOCAINE)   Topical PRN Ayesha Washington NP           Allergies   Allergen Reactions   ? Hydromorphone Anaphylaxis and Unknown   ? Clindamycin Diarrhea   ? Cephalexin Rash       Social History     Social History   ? Marital status:      Spouse name: N/A   ? Number of children: N/A   ? Years of education: N/A     Occupational History   ? Not on file.     Social History Main Topics   ? Smoking status: Former Smoker     Packs/day: 0.25     Years: 10.00     Quit date: 6/27/1958   ? Smokeless tobacco: Never Used   ? Alcohol use No   ? Drug use: No   ? Sexual activity: Yes     Partners: Female     Other Topics Concern   ? Not on file     Social History Narrative       Family History   Problem Relation Age of Onset   ? Early death Sister        Review of Systems:  Theodore Gomes no new numbess, tingling or weakness, redness or rashes, fevers, new masses, abdominal bloating or discomfort, unexplained weight loss, new ulcers, shortness of breath and chest pain  Full 12 point review of systems was completed.      Lab Results   Component Value Date/Time    PREALBUMIN 12.1 (L) 03/18/2014 05:23 AM       Status:   Final result   Visible to patient:  No (Not Released) Next appt:  11/28/2017 at 12:00 AM in Cardiology (Remote Device Check From Home) Dx:  Chronic venous hypertension (idiopath...   Culture   2+ Staphylococcus aureus (!)      2+ Enterococcus faecalis (!)      1+ Enterococcus faecium (!)      1+ Coagulase negative Staphylococcus (!)      Stain   No polymorphonuclear leukocytes seen      1+ Gram negative bacilli      Resulting Agency: [facility]   Susceptibility    Staphylococcus aureus     ADILIA     Cefazolin <=2  Sensitive     Clindamycin See Comment  Resistant     Doxycycline <=0.5  Sensitive     Erythromycin >4  Resistant     Levofloxacin >4  Resistant     Oxacillin 0.5  Sensitive     Trimethoprim + Sulfamethoxazole <=1/19  Sensitive     Vancomycin 1  Sensitive           Susceptibility    Enterococcus faecalis     ADILIA     Ampicillin 1  Sensitive           Susceptibility    Enterococcus faecium     ADILIA     Ampicillin 1  Sensitive           Susceptibility Comments   Staphylococcus aureus   This isolate is presumed to be resistant based on detection of inducible clindamycin resistance.              Physical Exam:  Vitals:    10/30/17 1340   BP: 140/82   Pulse: 66   Resp: 16   Temp: 97.6  F (36.4  C)     BMI 26.89 (decreased)    Circumferential measures:    Vasc Edema 8/30/2017 9/7/2017 9/14/2017 9/21/2017 10/30/2017   Right just above MTP 23 24.3 - - 25   Right Ankle 23 23.8 - - 26   Right Widest Calf 34.5 33.8 - - 35.8   Right Thigh Up 10cm - - - - -   Left - just above MTP 23 24.8 24.8 23 25   Left Ankle 23.5 23.7 23.7 24 24.5   Left Widest Calf 35 34.8 34.8 32 39.2   Left Thigh Up 10cm - - - - -     Measures stable and under good control.    General:  89 y.o. male in no apparent distress.  Alert and oriented x 3.  Cooperative. Affect normal.    Vascular: Dorsalis pedis and posterior tibialis pulses are strong and equal bilaterally. There are no significant telangietasias, medial ankle venous flares, venous varicosities   and spider veins . There is normal capillary refill.     Integumentary: Skin of the legs is uniformly warm and dry and and hyperpigmentated. Left calf has multiple small ulcerations with eschar and weeping.  Along the left lateral foot and ankle there is weeping and crusting.  Slough is noted in all areas with slight keli wound maceration.  Drainage is serous with slight malodor.   It was noted that there was increased pain with debridement today.   There is left leg rubor and no calor.   Nails are thickened with decreased hair growth on toes, rubor.     Please see flowsheet for measures.                 6/16/17   L med calf               L foot dorsum               8/30/17 L med calf                 L foot dorsum    Ellie Delcid MD, ABWMS, FACCWS, [facility]  Medical Director Wound Care and Lymphedema  [facility]  [phone]

## 2021-06-25 NOTE — PROGRESS NOTES
Progress Notes by Sixto Cartagena RN at 7/27/2017  2:30 PM     Author: Sixto Cartagena RN Service: -- Author Type: Registered Nurse    Filed: 7/27/2017  4:06 PM Encounter Date: 7/27/2017 Status: Signed    : Sixto Cartagena RN (Registered Nurse)                       7/27/17 patient arrives stating left foot increased pain to a 10 at times and to touch. Left leg red and warm to touch. Patient has been on doxycycline for 3 days with little help.Patient is not tolerating 2 layer lite to left foot.Jacquie Sandoval CNP came and assessed patient. Explained should go to ED. Patient refused. They have a wedding Friday.Dressing per Jacquie Sandoval CNP orders and patient to return tomorrow for nurse visit. Wife very [place] and wants us to take the pain away and not wrap his foot with compression. I explained needs to elevate and use compression and dressing on foot.If control drainage pain may improve along with compression.  Nurse Visit      Date of Service:7/27/2017    Chief Complaint: Patient presents to clinic for evaluation of their ulcer and and swelling    Dressing on Arrival right velcro no foot compression/left 2 layer lite      Allergies: Hydromorphone; Clindamycin; and Cephalexin    Assessment:    /58  Pulse 72  Temp 97.8  F (36.6  C) (Oral)   Resp 14    General:  Patient presents to clinic in no apparent distress.  Psychiatric:  Alert and oriented x3.   Lower extremity:  edema is present.    Integumentary:  Skin is uniformly warm, dry and pink.    Wound size:   Wound 01/28/16 Toe Right;2nd digit (Active)   Post Size Length 1.5 1/28/2016  3:00 PM   Post Size Width 1 1/28/2016  3:00 PM   Post Total Sq cm 1.5 1/28/2016  3:00 PM       Wound 07/20/17 left anterior foot (Active)   Pre Size Length 1.5 7/27/2017  2:00 PM   Pre Size Width 1 7/27/2017  2:00 PM   Pre Total Sq cm 1.5 7/27/2017  2:00 PM   Description scattered small ulcers 7/20/2017  2:00 PM   Prodcut Used Alginate;ABD Pad 7/27/2017  2:00 PM       Wound  07/20/17 left medial ankle (Active)   Pre Size Length 1.5 7/27/2017  2:00 PM   Pre Size Width 1 7/27/2017  2:00 PM   Pre Total Sq cm 1.5 7/27/2017  2:00 PM   Prodcut Used ABD Pad;Alginate 7/27/2017  2:00 PM       Wound 07/20/17 left 2nd digit (Active)   Pre Size Length 1.2 7/27/2017  2:00 PM   Pre Size Width 0.7 7/27/2017  2:00 PM   Pre Total Sq cm 0.84 7/27/2017  2:00 PM       Wound 06/29/14 Other (Comment) Arm Left;Right rash (Active)       Wound 06/29/14 Erythema Sacrum Other (Comment) red-blanchable (Active)      Undermining none.    The periwoundskin is erythema left foot and leg/hemosciderin  Bilateral legs       Plan:         1. Patient will Follow up tomorrow nurse visit         2. Treatment provided:     Cleansed with: normal saline/soap and water  Protected skin with: remedy lotion  Applied: ADB pads  Packed/filled with: calcium alginate ag/ weave between toes  Secured with: roll gauze/tape/tubigrip

## 2021-06-25 NOTE — PROGRESS NOTES
Progress Notes by Romi Rodriguez RN at 11/20/2017 11:00 AM     Author: Romi Rodriguez RN Service: -- Author Type: Registered Nurse    Filed: 11/20/2017 12:10 PM Encounter Date: 11/20/2017 Status: Signed    : Romi Rodriguez RN (Registered Nurse)           Left medial thigh 11/20        Left leg 11/20        Right leg 11/20    Nurse Visit      Date of Service:11/20/2017    Chief Complaint: Patient presents to clinic for evaluation of their bilateral lower extremity wounds and swelling    Dressing on Arrival: Bilateral 2 -layer lites. Foam pad to left medial thigh      Allergies: Hydromorphone; Clindamycin; and Cephalexin    Assessment:    /68  Pulse 70  Temp 98.2  F (36.8  C) (Oral)      Vasc Edema 9/21/2017 10/30/2017 11/7/2017 11/16/2017 11/20/2017   Right just above MTP - 25 - 24 24.4   Right Ankle - 26 - 26 24.4   Right Widest Calf - 35.8 - 42 41   Right Thigh Up 10cm - - - - -   Left - just above MTP 23 25 24 24 23.5   Left Ankle 24 24.5 23.5 23 23.8   Left Widest Calf 32 39.2 35.7 35 37.6   Left Thigh Up 10cm - - - - -         General:  Patient presents to clinic in no apparent distress.  Psychiatric:  Alert and oriented x3.   Lower extremity:  edema is present.    Integumentary:  Skin is uniformly warm, dry and pink.    Wound size:   Wound 07/20/17 left anterior foot (Active)   Pre Size Length 0.2 11/20/2017 11:00 AM   Pre Size Width 0.2 11/20/2017 11:00 AM   Pre Size Depth 0.1 9/21/2017  3:00 PM   Pre Total Sq cm 0.04 11/20/2017 11:00 AM   Post Size Length 0 9/28/2017 12:00 PM   Post Size Width 0 9/28/2017 12:00 PM   Post Size Depth 0 9/28/2017 12:00 PM   Post Total Sq cm 0.35 9/14/2017 12:00 PM   Undermined n 9/14/2017 12:00 PM   Tunneling n 9/14/2017 12:00 PM   Description red wound bed 8/30/2017  2:00 PM   Prodcut Used Foam 9/21/2017  3:00 PM       Wound 11/03/17 left medial leg superior (Active)   Pre Size Length 0.5 11/20/2017 11:00 AM   Pre Size Width 0.5 11/20/2017 11:00 AM   Pre Total  Sq cm 0.25 11/20/2017 11:00 AM       Wound 11/03/17 left medial leg inferior (Active)   Pre Size Length 0.8 11/20/2017 11:00 AM   Pre Size Width 0.6 11/20/2017 11:00 AM   Pre Total Sq cm 0.48 11/20/2017 11:00 AM       Wound 11/07/17 right buttock (Active)   Pre Size Length 0.5 11/7/2017  2:00 PM   Pre Size Width 0.6 11/7/2017  2:00 PM   Pre Total Sq cm 0.3 11/7/2017  2:00 PM       Wound 11/16/17 right medial ankle (Active)   Pre Size Length 0.6 11/20/2017 11:00 AM   Pre Size Width 0.4 11/20/2017 11:00 AM   Pre Total Sq cm 0.24 11/20/2017 11:00 AM       Wound 06/29/14 Other (Comment) Arm Left;Right rash (Active)       Wound 06/29/14 Erythema Sacrum Other (Comment) red-blanchable (Active)       Incision 08/22/17 Chest Left (Active)      Undermining is not present    The periwoundskin is pink, macerated left ankle area and right posterior lower calf area.      Plan:         1. Patient will Follow up Friday for nurse visit         2. Treatment provided: Patient arrives today with wife for bilateral 2-layer lite dressing changes. Drainage noted to have leaked through on left ankle area. Yellowish in color. No foul odor noted. There was no slippage noted on either leg. Wife reports that she noticed a new open spot on left medial thigh (see photo). She reports this area began weeping and soaked through onto his pants. Adaptic touch, Allevyn foam adhesive and ace wrap applied to left thigh area. Gave wife enough for 3 dressing changes and instructed her to change again on Wednesday or before that if it soaks through. Wife is understanding of plan. Spoke to patient and wife about buttock wound and they refused to have me assess it today. Let them know that Dr. Delcid would look at it next week and advise at that time as to what dressing to use. They are currently using barrier cream and bandaid. Educated patient and wife on the importance of offloading area as much as possible and to change position frequently throughout  the day.     Cleansed with: Remedy skin cleanser/normal saline (wounds)  Protected skin with: Remedy skin repair cream/no sting  Applied: silvercel to open wounds, viscopaste, ABD  Covered with: Roll gauze  Secured with: 2-layer lites

## 2021-08-03 PROBLEM — L97.521 SKIN ULCER OF LEFT FOOT, LIMITED TO BREAKDOWN OF SKIN (H): Status: RESOLVED | Noted: 2017-01-01 | Resolved: 2017-01-01
